# Patient Record
(demographics unavailable — no encounter records)

---

## 2020-11-13 NOTE — EMERGENCY DEPARTMENT REPORT
ED Dizziness HPI





- General


Chief Complaint: Dizziness


Stated Complaint: DIZZINESS,SICK


Time Seen by Provider: 20 14:48


Source: patient


Mode of arrival: Ambulatory


Limitations: No Limitations





- History of Present Illness


Initial Comments: 





47-year-old -American male past medical history of hypertension and end-

stage renal disease requiring dialysis was last seen at dialysis today presents 

emergency department complaining of a dull throbbing bifrontal headache which is

been present for the last week associated with dizziness acute.  Reports no 

chest pain, no palpitations, no fever, chills, sweats, no nausea, no vomiting.  

He reports no scotomas.


MD Complaint: dizziness


Timing: sudden onset


Description: "room spinning"


History of Same: No


History of Trauma: No


Severity: mild, moderate


Improves With: nothing


Worsens With: nothing


Associated Symptoms: denies: ataxia, chest pain, confusion, fever/chills, loss o

f appetite, seizure, syncope, weakness





- Related Data


                                  Previous Rx's











 Medication  Instructions  Recorded  Last Taken  Type


 


carvediloL [Coreg] 6.25 mg PO BID #60 tablet 01/15/14 Unknown Rx











                                    Allergies











Allergy/AdvReac Type Severity Reaction Status Date / Time


 


No Known Allergies Allergy   Unverified 01/10/14 09:48














ED Review of Systems


ROS: 


Stated complaint: DIZZINESS,SICK


Other details as noted in HPI





Comment: All other systems reviewed and negative





ED Past Medical Hx





- Past Medical History


Previous Medical History?: Yes


Hx Hypertension: Yes


Hx Congestive Heart Failure: No


Hx Diabetes: No


Hx Renal Disease: Yes (Dialysis (MWF))


Hx Asthma: No


Hx COPD: No


Additional medical history: Brugada Syndrome, ventral abdominal hernia





- Surgical History


Past Surgical History?: No





- Social History


Smoking Status: Never Smoker


Substance Use Type: None





- Medications


Home Medications: 


                                Home Medications











 Medication  Instructions  Recorded  Confirmed  Last Taken  Type


 


carvediloL [Coreg] 6.25 mg PO BID #60 tablet 01/15/14  Unknown Rx














ED Physical Exam





- General


Limitations: No Limitations


General appearance: alert, in no apparent distress





- Head


Head exam: Present: atraumatic, normocephalic





- Eye


Eye exam: Present: normal appearance, PERRL, EOMI


Pupils: Present: normal accommodation





- ENT


ENT exam: Present: normal exam, mucous membranes moist, normal external ear 

exam.  Absent: normal orophraynx, mucous membranes dry





- Neck


Neck exam: Present: normal inspection, full ROM.  Absent: tenderness





- Respiratory


Respiratory exam: Present: normal lung sounds bilaterally.  Absent: respiratory 

distress, wheezes, rhonchi, stridor





- Cardiovascular


Cardiovascular Exam: Present: regular rate, normal rhythm.  Absent: bradycardia,

 tachycardia, systolic murmur, diastolic murmur, rubs, gallop





- GI/Abdominal


GI/Abdominal exam: Present: soft, normal bowel sounds.  Absent: distended, ten

derness, guarding





- Rectal


Rectal exam: Present: deferred.  Absent: normal inspection, normal rectal tone





- Extremities Exam


Extremities exam: Present: normal inspection





- Back Exam


Back exam: Present: normal inspection, full ROM, muscle spasm





- Neurological Exam


Neurological exam: Present: alert, oriented X3, CN II-XII intact





- Psychiatric


Psychiatric exam: Present: normal affect, normal mood





- Skin


Skin exam: Present: warm, dry, intact, normal color.  Absent: rash





ED Course


                                   Vital Signs











  20





  12:18


 


Temperature 97.8 F


 


Pulse Rate 73


 


Respiratory 18





Rate 


 


Blood Pressure 141/80


 


O2 Sat by Pulse 97





Oximetry 














ED Medical Decision Making





- Lab Data


Result diagrams: 


                                 20 12:48





                                 20 12:48





- Radiology Data





[facility] 


[address] [place] [address] 





Cat Scan Report 


Signed 





 Patient: KIRBY GAUTHIER MR#: [id number] 


 8 


 : 1973 Acct:[id number] 





 Age/Sex: 47 / M ADM Date: 20 





 Loc: ED 


 Attending Dr: 








 Ordering Physician: IVET FONTANA 


 Date of Service: 20 


 Procedure(s): CT head/brain wo con 


 Accession Number(s): [id number] 





 cc: IVET FONTANA 








 CT head/brain wo con 





INDICATION / CLINICAL INFORMATION: 


47 years Male; headache. 





TECHNIQUE: Routine CT head without contrast. All CT scans at this location are 

performed using CT 


 dose reduction for ALARA by means of automated exposure control. 





COMPARISON: 


1/10/2014 





FINDINGS: 





BRAIN / INTRACRANIAL CONTENTS: Multiple, small lacunar infarcts seen in the 

anterior 


 gangliocapsular regions. These findings are new from prior, but have a chronic 

appearance. 





It would be difficult to evaluate for a small focus of acute ischemia without 

diffusion imaging by 


 MRI. 





Otherwise, no acute hemorrhage, mass effect, midline shift, hydrocephalus, or 

acute, large 


 territorial infarct. No signs of significant atrophy or chronic infarct. Mild, 

nonspecific white 


 matter disease noted, which has progressed from prior, as well. 





CRANIOCERVICAL JUNCTION: No significant abnormality. 





ORBITS: No significant abnormality of visualized orbits. 


SINUSES / MASTOIDS: Small air-fluid level seen in the right sphenoid sinus. Mild

 mucosal thickening


 seen in the ethmoids as well. 





ADDITIONAL FINDINGS: None. 





IMPRESSION: 


1. Progression and white matter and gangliocapsular disease as described above. 

Follow-up with 


 diffusion imaging by MRI, as clinically warranted. 


2. Otherwise, no focal mass, hemorrhage, hydrocephalus, or acute, large infarct 

appreciated. 








Signer Name: Kyle Malin MD, III 


Signed: 2020 4:33 PM 


Workstation Name: [email or web address] 








 Transcribed By:  


 Dictated By: Kyle Malin MD 


 Electronically Authenticated By: Kyle Malin MD 


 Signed Date/Time: 20 1633 











 DD/DT: 20 


 TD/TT: 





- Medical Decision Making





This patient presents with a headache. Differential diagnosis includes migraine 

versus tension type headache. No headache red flags. Neurologic exam without 

evidence of meningismus, focal neurologic findings.Based on the patient's 

history and physical there is very low clinical suspicion for significant 

intracranial pathology. The headache was NOT sudden onset, NOT maximal at onset,

 there are NO neurologic findings, the patient does NOT have a fever, the 

patient does NOT have any jaw claudication, the patient does NOT endorse a 

clotting disorder, patient DENIES any trauma or eye pain and the headache is NOT

 associated with dizziness or ataxia.  Presentation not consistent with acute 

intracranial bleed to include SAH (lack of risk factors, headache history). 

Presentation not consistent with acute CNS infection to include meningitis or 

brain abscess, Temporal arteritis unlikely, as is acute angle closure glaucoma 

given history and physical findings. Presentation not consistent with other 

acute, emergent causes of headache at this time. Plan to treat symptomatically 

with pain medication. No indication for imaging/LP at this time.





Plan: pain medication, CT brain shows chronic infarct changes which may need 

further evaluation with an MRI at a later date.  He remained neurologically 

stable and intact throughout his entire emergency department visit here today.  

No signs of any neurological deficit.  I will provide him with a copy of his CT 

scan report and advised him explicitly the need to follow-up with neurology for 

further evaluation and treatment options but advised him to start with his 

primary care provider to guide and follow along with him in this process.  Also 

advised him on the on the need for compliance with his dialysis.  It is very 

likely this is a postdialysis headache him and post dialysis presyncope given 

the onset.








Critical care attestation.: 


If time is entered above; I have spent that time in minutes in the direct care 

of this critically ill patient, excluding procedure time.








ED Disposition


Clinical Impression: 


 Cephalgia





Disposition: DC-01 TO HOME OR SELFCARE


Is pt being admited?: No


Does the pt Need Aspirin: No


Condition: Stable


Instructions:  General Headache Without Cause


Referrals: 


PRIMARY CARE,MD [Primary Care Provider] - 3-5 Days


Forms:  Work/School Release Form(ED)

## 2020-11-13 NOTE — EVENT NOTE
ED Screening Note


Date of service: 11/13/20


Time: 12:16


ED Screening Note: 


47-year-old  male brought in states that he has dizziness x1 week.  

Denies any chest pain shortness of breathing no nausea no vomiting or abdominal 

pain.  Admits to headache no blurred vision.





This initial assessment/diagnostic orders/clinical plan/treatment(s) is/are 

subject to change based on patients health status, clinical progression and re-

assessment by fellow clinical providers in the ED. Further treatment and workup 

at subsequent clinical providers discretion. Patient/guardian urged not to elope

from the ED as their condition may be serious if not clinically assessed and 

managed. 





Initial orders include:

## 2022-05-08 NOTE — EMERGENCY DEPARTMENT REPORT
ED General Adult HPI





- General


Chief complaint: Dyspnea/Respdistress


Stated complaint: SOB,COUGHING UP BLOOD


PUI?: No


Source: patient, EMS ( EMS documentation not available at time of chart 

dictation ), RN notes reviewed, old records reviewed


Limitations: Physical Limitation





- History of Present Illness


Initial comments: 





Nephrology: Dr. KALEN Fallon








The patient is a 48-year-old gentleman, with a history of end-stage renal 

disease on hemodialysis, Monday, Wednesday, Friday, DVT, currently on Eliquis, 

history of Brugada syndrome.





Patient was last dialyzed on Friday.





He presents to the ER today with a complaint of tongue bleeding, where he 

believes he accidentally bit his tongue, cough, wheezing and shortness of 

breath.





He denies physical pain at this time.


-: Gradual


Consistency: constant


Improves with: rest


Worsens with: movement





- Related Data


                                  Previous Rx's











 Medication  Instructions  Recorded  Last Taken  Type


 


carvediloL [Coreg] 6.25 mg PO BID #60 tablet 01/15/14 Unknown Rx











                                    Allergies











Allergy/AdvReac Type Severity Reaction Status Date / Time


 


No Known Allergies Allergy   Unverified 01/10/14 09:48














ED Review of Systems


ROS: 


Stated complaint: SOB,COUGHING UP BLOOD


Other details as noted in HPI





Constitutional: denies: fever


Eyes: denies: eye discharge


ENT: congestion.  denies: epistaxis


Respiratory: cough, shortness of breath, wheezing


Cardiovascular: edema.  denies: chest pain


Gastrointestinal: denies: abdominal pain, vomiting, hematemesis, melena, 

hematochezia


Genitourinary: denies: dysuria


Musculoskeletal: denies: back pain


Neurological: weakness


Hematological/Lymphatic: easy bleeding





ED Past Medical Hx





- Past Medical History


Hx Hypertension: Yes


Hx Congestive Heart Failure: No


Hx Diabetes: No


Hx Renal Disease: Yes (Dialysis (MWF))


Hx Asthma: No


Hx COPD: No


Additional medical history: Brugada Syndrome, ventral abdominal hernia





- Social History


Smoking Status: Never Smoker


Substance Use Type: None





- Medications


Home Medications: 


                                Home Medications











 Medication  Instructions  Recorded  Confirmed  Last Taken  Type


 


carvediloL [Coreg] 6.25 mg PO BID #60 tablet 01/15/14  Unknown Rx














ED Physical Exam





- General


Limitations: Physical Limitation


General appearance: alert, anxious, in distress





- Head


Head exam: Present: atraumatic, normocephalic





- Eye


Eye exam: Present: normal appearance, EOMI.  Absent: nystagmus





- ENT


ENT exam: Present: normal exam, normal orophraynx, mucous membranes moist, 

normal external ear exam, other (There is superficial bleeding noted to the 

dorsal aspect of the anterior)





- Neck


Neck exam: Present: normal inspection, full ROM.  Absent: tenderness, 

meningismus





- Respiratory


Respiratory exam: Present: respiratory distress, wheezes, rales, rhonchi





- Cardiovascular


Cardiovascular Exam: Present: regular rate, normal rhythm, normal heart sounds, 

JVD.  Absent: bradycardia, tachycardia, irregular rhythm, systolic murmur, 

diastolic murmur, rubs, gallop





- GI/Abdominal


GI/Abdominal exam: Present: soft, hernia.  Absent: distended, tenderness, 

guarding, rebound, rigid





- Rectal


Rectal exam: Present: deferred





- Extremities Exam


Extremities exam: Present: normal inspection, full ROM, pedal edema, other (2+ 

pulses noted in the bilateral upper and lower extremities.  There is no palpable

 cord.   negative Homans sign.  Muscular compartments are soft.  The pelvis is 

stable.).  Absent: calf tenderness





- Back Exam


Back exam: Present: normal inspection.  Absent: tenderness, CVA tenderness (R), 

CVA tenderness (L), paraspinal tenderness, vertebral tenderness





- Neurological Exam


Neurological exam: Present: alert, oriented X3, other (No facial droop.  Tongue 

midline.  Extraocular movements intact bilaterally.  Facial sensation intact to 

light touch in V1, V2, V3 distribution bilaterally.  5 and a 5 strength in 4 

extremities.  Sensation intact to light touch in 4 extremities.).  Absent: motor

 sensory deficit





- Psychiatric


Psychiatric exam: Present: anxious





- Skin


Skin exam: Present: warm, dry, intact, normal color.  Absent: rash





ED Course


                                   Vital Signs











  22





  00:05 01:02


 


Temperature 99 F 


 


Pulse Rate 84 77


 


Respiratory 20 16





Rate  


 


Blood Pressure 180/110 


 


Blood Pressure  194/114





[Left]  


 


O2 Sat by Pulse 98 100





Oximetry  














- Reevaluation(s)


Reevaluation #1: 





22 01:36


Differential diagnosis, including but not limited to: Flash pulmonary edema, 

end-stage renal disease on hemodialysis, anticoagulated, volume overload, 

hypertensive emergency





Assessment and plan: 48-year-old gentleman, with JVD, lower extremity edema, 

crackles, rales, hypertensive urgency, likely flash pulmonary edema, and fluid 

overload.  Start patient on nitroglycerin, as well as desmopressin.





Patient spitting up blood from tongue, likely secondary to superficial bite on 

tongue, do not see obvious wound to suture, start desmopressin.





Given this, would not start patient on BiPAP, but rather will start high flow 

high humidity nasal cannula.





EKG reviewed and appreciated.  Laboratory studies reviewed and appreciated.  

Elevated troponin is likely a type II troponin leak.





Contacted covering nephrology Dr Fallon, and critical care, Dr. Puckett








Discussed the patient's history, physical, laboratory studies and imaging studi

es and clinical impression.





His nephrologist will follow in consultation, and arrange for urgent he

modialysis.





Critical care will arrange for admission to the intensive care unit.





High-dose Lasix ordered.





Patient is agreeable to admission hospitalization.





Hospital physician, Dr. Berrios to admit to [facility]











Medical decision makin-year-old gentleman with evidence of fluid overload, 

hypertensive emergency, requires initiation of high flow high humidity nasal 

cannula, high-dose Lasix, nitroglycerin drip, urgent hemodialysis.


22 01:38








ED Medical Decision Making





- Lab Data


Result diagrams: 


                                 22 00:13





                                 22 00:13








Labs











  22





  00:13 00:13 00:13


 


WBC  5.6  


 


RBC  3.28 L  


 


Hgb  8.6 L  


 


Hct  27.5 L  


 


MCV  84  


 


MCH  26 L  


 


MCHC  31 L  


 


RDW  20.7 H  


 


Plt Count  166  


 


Lymph % (Auto)  8.8 L  


 


Mono % (Auto)  7.6 H  


 


Eos % (Auto)  2.3  


 


Baso % (Auto)  1.1  


 


Lymph # (Auto)  0.5 L  


 


Mono # (Auto)  0.4  


 


Eos # (Auto)  0.1  


 


Baso # (Auto)  0.1  


 


Seg Neutrophils %  80.2 H  


 


Seg Neutrophils #  4.5  


 


PT   14.1 


 


INR   0.98 


 


APTT   28.1 


 


Albumin/Globulin Ratio    1.5











                                   Lab Results











  22 Range/Units





  00:13 00:13 00:13 


 


WBC  5.6    (4.5-11.0)  K/mm3


 


RBC  3.28 L    (3.65-5.03)  M/mm3


 


Hgb  8.6 L    (11.8-15.2)  gm/dl


 


Hct  27.5 L    (35.5-45.6)  %


 


MCV  84    (84-94)  fl


 


MCH  26 L    (28-32)  pg


 


MCHC  31 L    (32-34)  %


 


RDW  20.7 H    (13.2-15.2)  %


 


Plt Count  166    (140-440)  K/mm3


 


Lymph % (Auto)  8.8 L    (13.4-35.0)  %


 


Mono % (Auto)  7.6 H    (0.0-7.3)  %


 


Eos % (Auto)  2.3    (0.0-4.3)  %


 


Baso % (Auto)  1.1    (0.0-1.8)  %


 


Lymph # (Auto)  0.5 L    (1.2-5.4)  K/mm3


 


Mono # (Auto)  0.4    (0.0-0.8)  K/mm3


 


Eos # (Auto)  0.1    (0.0-0.4)  K/mm3


 


Baso # (Auto)  0.1    (0.0-0.1)  K/mm3


 


Seg Neutrophils %  80.2 H    (40.0-70.0)  %


 


Seg Neutrophils #  4.5    (1.8-7.7)  K/mm3


 


PT   14.1   (12.2-14.9)  Sec.


 


INR   0.98   (0.87-1.13)  


 


APTT   28.1   (24.2-36.6)  Sec.


 


Albumin/Globulin Ratio    1.5  %











                                   Vital Signs











  22





  00:05


 


Temperature 99 F


 


Pulse Rate 84











                                   Lab Results











  22 Range/Units





  00:13 00:13 00:13 


 


WBC  5.6    (4.5-11.0)  K/mm3


 


RBC  3.28 L    (3.65-5.03)  M/mm3


 


Hgb  8.6 L    (11.8-15.2)  gm/dl


 


Hct  27.5 L    (35.5-45.6)  %


 


MCV  84    (84-94)  fl


 


MCH  26 L    (28-32)  pg


 


MCHC  31 L    (32-34)  %


 


RDW  20.7 H    (13.2-15.2)  %


 


Plt Count  166    (140-440)  K/mm3


 


Lymph % (Auto)  8.8 L    (13.4-35.0)  %


 


Mono % (Auto)  7.6 H    (0.0-7.3)  %


 


Eos % (Auto)  2.3    (0.0-4.3)  %


 


Baso % (Auto)  1.1    (0.0-1.8)  %


 


Lymph # (Auto)  0.5 L    (1.2-5.4)  K/mm3


 


Mono # (Auto)  0.4    (0.0-0.8)  K/mm3


 


Eos # (Auto)  0.1    (0.0-0.4)  K/mm3


 


Baso # (Auto)  0.1    (0.0-0.1)  K/mm3


 


Seg Neutrophils %  80.2 H    (40.0-70.0)  %


 


Seg Neutrophils #  4.5    (1.8-7.7)  K/mm3


 


PT   14.1   (12.2-14.9)  Sec.


 


INR   0.98   (0.87-1.13)  


 


APTT   28.1   (24.2-36.6)  Sec.


 


Sodium    132 L  (137-145)  mmol/L


 


Potassium    3.6  (3.6-5.0)  mmol/L


 


Chloride    91.2 L  ()  mmol/L


 


Carbon Dioxide    24  (22-30)  mmol/L


 


Anion Gap    20  mmol/L


 


BUN    44 H  (9-20)  mg/dL


 


Creatinine    11.0 H  (0.8-1.3)  mg/dL


 


Estimated GFR    6  ml/min


 


BUN/Creatinine Ratio    4  %


 


Glucose    89  ()  mg/dL


 


Calcium    8.6  (8.4-10.2)  mg/dL


 


Magnesium    2.60 H  (1.7-2.3)  mg/dL


 


Total Bilirubin    0.60  (0.1-1.2)  mg/dL


 


AST    13  (5-40)  units/L


 


ALT    12  (7-56)  units/L


 


Alkaline Phosphatase    152 H  ()  units/L


 


Total Creatine Kinase    329 H  ()  units/L


 


Troponin T    0.075 H  (0.00-0.029)  ng/mL


 


Total Protein    6.9  (6.3-8.2)  g/dL


 


Albumin    4.1  (3.9-5)  g/dL


 


Albumin/Globulin Ratio    1.5  %














- EKG Data


-: EKG Interpreted by Me


EKG shows normal: sinus rhythm





- EKG Data





22 00:56


The EKG is interpreted at 23: 58





Sinus rhythm, 77 bpm.  Normal axis, normal P wave axis, PVCs, QTC 4 6 2 ms, WI 

interval, 210 ms.  There is poor R wave progression.  This is not a STEMI





- Radiology Data


Radiology results: pending, report reviewed, image reviewed





CHEST 1 VIEW 2022 11:18 PM  INDICATION / CLINICAL INFORMATION: Dyspnea.  

COMPARISON: 1/10/2014  FINDINGS:  SUPPORT DEVICES: Left IJ catheter terminating 

in the mid SVC. HEART / MEDIASTINUM: There is cardiomegaly LUNGS / PLEURA: 

Pulmonary vascular indistinctness and cephalization. Increased interstitial 

prominence. There is increased opacification within the right lower lobe. No 

pneumothorax.  ADDITIONAL FINDINGS: No significant additional findings.  IMP

RESSION: 1. Cardiomegaly with moderate severe pulmonary edema. 2. Opacification 

within the right lower lobe which may represent atelectasis versus infiltrate.  

Signer Name: Husam Ann DO Signed: 2022 11:21 PM Workstation 

Name: [email or web address]


Critical Care Time: Yes


Critical care time in (mins) excluding proc time.: 35


Critical care attestation.: 


If time is entered above; I have spent that time in minutes in the direct care 

of this critically ill patient, excluding procedure time.








ED Disposition


Clinical Impression: 


 Hypertensive emergency, End stage renal disease, Pulmonary edema, 

Anticoagulated, Hemorrhage of tongue





Disposition:  ADMITTED AS INPATIENT


Is pt being admited?: Yes


Does the pt Need Aspirin: No


Condition: Critical


Instructions:  Pulmonary Edema (ED), Hypertension (ED)


Referrals: 


LIBBY BREWSTER MD [Primary Care Provider] - 3-5 Days

## 2022-05-09 NOTE — EVENT NOTE
<IVETTE ARMSTRONG - Last Filed: 05/09/22 17:52>


Date: 05/09/22


This is a 48-year-old male with known past medical history of ESRD on HD(M,W,F),

DVT on Eliquis, Brugada syndromed, and per patient O2 dependet at home admitted 

for pulmonary edema and hypertensive urgency





Patient seen and examined at the bedside. Patient is fully AAO, on HHFL at 40% 

and 20L SPO2 above 92%. Patient still with dyspnea with exertion, plan for HD 

today per Nephrology. Oral bleeding resolved, H&H remains strable, will resume 

home AC- Eliquis. Left upper chest Permacath and LUE Av-fistula noted, per 

patient plan was to remove permacath two weeks ago however appointment was 

rescheduled due to unforeseen events. D/w Nephro plan for possible permacath 

removal sometimes this week. Patient remains on Nitro gtt, still hypertensive 

this am. Will resume patient's home antihypertensive regimen. Titrate Nitro gtt 

for SBP less than 160. CCM is also following. Diagnosis and plan of care 

discussed with patient at the bedside. Patient verbalized understanding and 

agreed with current plan of care. All questions and concerns addressed at this 

time. Team will continue to f/u with any further updates.





 





<CHANEL MIRANDA - Last Filed: 05/10/22 07:36>


I saw and evaluated the patient. I agree with the findings and the plan of care 

as documented in the Nurse Practitioner's~note, with the following corrections 

and additions.

## 2022-05-09 NOTE — XRAY REPORT
CHEST 1 VIEW 5/8/2022 11:18 PM



INDICATION / CLINICAL INFORMATION: Dyspnea.



COMPARISON: 1/10/2014



FINDINGS:



SUPPORT DEVICES: Left IJ catheter terminating in the mid SVC.

HEART / MEDIASTINUM: There is cardiomegaly 

LUNGS / PLEURA: Pulmonary vascular indistinctness and cephalization. Increased interstitial prominenc
e. There is increased opacification within the right lower lobe. No pneumothorax. 



ADDITIONAL FINDINGS: No significant additional findings.



IMPRESSION:

1. Cardiomegaly with moderate severe pulmonary edema.

2. Opacification within the right lower lobe which may represent atelectasis versus infiltrate.



Signer Name: Husam Ann DO 

Signed: 5/9/2022 12:21 AM

Workstation Name: [email or web address]

## 2022-05-09 NOTE — ELECTROCARDIOGRAPH REPORT
[facility]

                                       

Test Date:    2022               Test Time:    23:58:10

Pat Name:     KIRBY GAUTHIER            Department:   

Patient ID:   [id number]          Room:         A263 1

Gender:       M                        Technician:   CHERRY

:          1973               Requested By: NAKITA BUTLER

Order Number: [id number]              Reading MD:   Austin Chen

                                 Measurements

Intervals                              Axis          

Rate:         77                       P:            58

NY:           210                      QRS:          45

QRSD:         87                       T:            47

QT:           406                                    

QTc:          462                                    

                           Interpretive Statements

Sinus rhythm

Ventricular premature complex

nonspecific st-t

No previous ECG available for comparison

Electronically Signed On 2022 11:23:50 EDT by Austin Chen

## 2022-05-09 NOTE — CONSULTATION
History of Present Illness





- Reason for Consult


Consult date: 05/09/22


end stage renal disease





- History of Present Illness


The patient is a 59 YO male with history significant for Hypertension, DVT on 

Eliquis, Anemia, history of Brugada syndrome and ESRD on hemodialysis(MWF) who 

presented to [facility] ED 5/8/22 with c/o sob for 2 days.  Associated symptoms 

include cough, orthopnea and wheezing.  Patient also reports tongue bleeding, 

where he believes he accidentally bit his tongue.  Admits to drinking excessive 

fluids.  Patient denies any N, V, abd pain, fever, chills, rash, leg swelling, 

cp, dizziness, hemoptysis or weakness.  He was last dialyzed 3 days ago.  All 

lab and imaging studies reviewed.  Patient is currently on HFNC O2.  Nephrology 

consulted for treatment of ESRD and volume overload.








Past History


Past Medical History: anemia, dialysis, ESRD, hypertension, renal failure, other

(Brugada Syndrome, ventral abdominal hernia)


Past Surgical History: No surgical history


Social history: no significant social history


Family history: hypertension





Medications and Allergies


                                    Allergies











Allergy/AdvReac Type Severity Reaction Status Date / Time


 


No Known Allergies Allergy   Unverified 01/10/14 09:48











                                Home Medications











 Medication  Instructions  Recorded  Confirmed  Last Taken  Type


 


carvediloL [Coreg] 6.25 mg PO BID #60 tablet 01/15/14 05/10/22 Unknown Rx


 


Clopidogrel [Plavix] 75 mg PO QDAY 05/09/22 05/09/22 Unknown History


 


Doxazosin [Cardura] 4 mg PO BID 05/09/22 05/09/22 Unknown History


 


Eliquis 5 mg PO BID 05/09/22 05/09/22 Unknown History


 


Isosorbide Mononitrate [Isosorbide 60 mg PO QDAY 05/09/22 05/09/22 Unknown 

History





Mononitrate ER]     


 


Nifedipine ER 90 mg PO QDAY 05/09/22 05/09/22 Unknown History


 


hydrALAZINE 100 mg PO BID 05/09/22 05/09/22 Unknown History











Active Meds: 


Active Medications





Acetaminophen (Acetaminophen 325 Mg Tab)  650 mg PO Q4H PRN


   PRN Reason: Pain MILD(1-3)/Fever >100.5/HA


Albuterol (Albuterol 2.5 Mg/3 Ml Nebu)  2.5 mg IH Q3HRT PRN


   PRN Reason: Shortness Of Breath


Albuterol/Ipratropium (Ipratropium/Albuterol Sulfate 3 Ml Ampul.Neb)  1 ampul IH

Q6HRT [facility]


   Last Admin: 05/09/22 08:05 Dose:  1 ampul


   


Carvedilol (Carvedilol 6.25 Mg Tab)  6.25 mg PO BID [facility]


Epoetin Ranjeet-epbx (Epoetin Ranjeet-Epbx 20,000 Unit/1 Ml Vial)  20,000 unit SUB-Q 

DEONDRE PRN


   PRN Reason: hemodialysis


Famotidine (Famotidine 20 Mg/2 Ml Inj)  20 mg IV DAILY [facility]


Heparin Sodium (Porcine) (Heparin 10,000 Units/10 Ml Vial)  3,000 unit IV DEONDRE 

PRN


   PRN Reason: hemodialysis


Hydromorphone HCl (Hydromorphone 1 Mg/1 Ml Inj)  0.5 mg IV Q3H PRN


   PRN Reason: Pain , Severe (7-10)


Nitroglycerin/Dextrose (Tridil Drip 50mg/250ml)  50 mg in 250 mls @ 3 mls/hr IV 

TITR [facility]; Protocol


   Last Admin: 05/09/22 02:10 Dose:  10 mcg/min, 3 mls/hr


   


Sodium Chloride (Nacl 0.9%)  100 mls @ 999 mls/hr IV DEONDRE PRN


   PRN Reason: Hypotension


Morphine Sulfate (Morphine 2 Mg/1 Ml Inj)  2 mg IV Q4H PRN


   PRN Reason: Pain, Moderate (4-6)


Ondansetron HCl (Ondansetron 4 Mg/2 Ml Inj)  4 mg IV Q8H PRN


   PRN Reason: Nausea And Vomiting


Sodium Chloride (Sodium Chloride 0.9% 10 Ml Flush Syringe)  10 ml IV BID [facility]


Sodium Chloride (Sodium Chloride 0.9% 10 Ml Flush Syringe)  10 ml IV PRN PRN


   PRN Reason: LINE FLUSH











Review of Systems


All systems: negative





Exam





- Vital Signs


Vital signs: 


                                   Vital Signs











Temp Pulse Resp BP Pulse Ox


 


 99 F   84   20   180/110   98 


 


 05/09/22 00:05  05/09/22 00:05  05/09/22 00:05  05/09/22 00:05  05/09/22 00:05














Results





- Lab Results





                                 05/11/22 04:53





                                 05/11/22 04:53


                             Most recent lab results











ABG pH  7.448  (7.320-7.450)   05/09/22  01:30    


 


ABG O2 Saturation  88.6  (0-100)   05/09/22  01:30    


 


Calcium  8.6 mg/dL (8.4-10.2)   05/09/22  00:13    


 


Magnesium  2.60 mg/dL (1.7-2.3)  H  05/09/22  00:13    














Assessment and Plan


1. ESRD:


Patient is on maintenance hemodialysis, MWF schedule.


Last outpatient HD 5/6.


Hemodialysis: today.


 


2. FEN:


Volume overload, UF with HD, monitor.


Counseled to limit fluid intake.


Monitor lytes and volume status.





3. Acute hypoxic respiratory failure, POA:


2/2 Acute pulmonary edema in the setting of volume overload.


R/o CHF.


Echo.


Volume control thru HD.


Supplemental O2, wean as tolerated.





4. Suspected CHF:


Clinical findings.


Echo pending.





5. Hx of DVT:


Eliquis.





6. Hypertension:


Continue home meds.


Volume control thru HD.


Follow BP.





7. Anemia, POA:


Chronic.


Epogen with HD as needed.


Monitor.





8. Hemorrhage from tongue, POA:


Stable.  We will monitor the patient closely





9. Brugada syndrome.











Subjective:


Patient was seen and examined at the bedside.











Examination:


General appearance: well-developed, appears stated age, thin built, resp 

distress noted, HFNC O2


HEENT: ADDY


Neck: trachea midline


Respiratory: bibasal rales heard


Heart: S1S2, no murmur


Abdomen: soft, bowel sounds heard, NT, no palpable mass


Integumentary: no obvious rash


Neurologic: AO, non-focal


Ext: no edema


Hemodialysis access: L arm AVF, R IJ tunnel catheter

## 2022-05-09 NOTE — PROGRESS NOTE
Hospitalist Physical





- Constitutional


Vitals: 


                                        











Temp Pulse Resp BP Pulse Ox


 


 99 F   86   24   191/111   98 


 


 05/09/22 00:05  05/09/22 10:00  05/09/22 10:00  05/09/22 09:59  05/09/22 10:00











General appearance: Present: no acute distress, well-nourished





HEART Score





- HEART Score


Troponin: 


                                        











Troponin T  0.075 ng/mL (0.00-0.029)  H  05/09/22  00:13    














Results





- Labs


CBC & Chem 7: 


                                 05/09/22 00:13





                                 05/09/22 00:13


Labs: 


                             Laboratory Last Values











WBC  5.6 K/mm3 (4.5-11.0)   05/09/22  00:13    


 


RBC  3.28 M/mm3 (3.65-5.03)  L  05/09/22  00:13    


 


Hgb  8.6 gm/dl (11.8-15.2)  L  05/09/22  00:13    


 


Hct  27.5 % (35.5-45.6)  L  05/09/22  00:13    


 


MCV  84 fl (84-94)   05/09/22  00:13    


 


MCH  26 pg (28-32)  L  05/09/22  00:13    


 


MCHC  31 % (32-34)  L  05/09/22  00:13    


 


RDW  20.7 % (13.2-15.2)  H  05/09/22  00:13    


 


Plt Count  166 K/mm3 (140-440)   05/09/22  00:13    


 


Lymph % (Auto)  8.8 % (13.4-35.0)  L  05/09/22  00:13    


 


Mono % (Auto)  7.6 % (0.0-7.3)  H  05/09/22  00:13    


 


Eos % (Auto)  2.3 % (0.0-4.3)   05/09/22  00:13    


 


Baso % (Auto)  1.1 % (0.0-1.8)   05/09/22  00:13    


 


Lymph # (Auto)  0.5 K/mm3 (1.2-5.4)  L  05/09/22  00:13    


 


Mono # (Auto)  0.4 K/mm3 (0.0-0.8)   05/09/22  00:13    


 


Eos # (Auto)  0.1 K/mm3 (0.0-0.4)   05/09/22  00:13    


 


Baso # (Auto)  0.1 K/mm3 (0.0-0.1)   05/09/22  00:13    


 


Seg Neutrophils %  80.2 % (40.0-70.0)  H  05/09/22  00:13    


 


Seg Neutrophils #  4.5 K/mm3 (1.8-7.7)   05/09/22  00:13    


 


PT  14.1 Sec. (12.2-14.9)   05/09/22  00:13    


 


INR  0.98  (0.87-1.13)   05/09/22  00:13    


 


APTT  28.1 Sec. (24.2-36.6)   05/09/22  00:13    


 


ABG pH  7.448  (7.320-7.450)   05/09/22  01:30    


 


POC ABG pCO2  31.9 mmHg (32.0-48.0)  L  05/09/22  01:30    


 


POC ABG pO2  53.3 mmHg ()  L  05/09/22  01:30    


 


POC ABG HCO3  21.6   05/09/22  01:30    


 


ABG O2 Saturation  88.6  (0-100)   05/09/22  01:30    


 


POC ABG Base Excess  -1.8   05/09/22  01:30    


 


ABG Hemoglobin  10.9  (12.0-17.5)  L  05/09/22  01:30    


 


ABG Oxyhemoglobin  87.9  (94-98)  L  05/09/22  01:30    


 


ABG Methemoglobin  0.3  (0.0-1.5)   05/09/22  01:30    


 


ABG Sodium  Not Reportable   05/09/22  01:30    


 


ABG Potassium  Not Reportable   05/09/22  01:30    


 


ABG Chloride  Not Reportable   05/09/22  01:30    


 


ABG Glucose  Not Reportable   05/09/22  01:30    


 


Carboxyhemoglobin  0.5  (0.5-1.5)   05/09/22  01:30    


 


FiO2 %  21.0   05/09/22  01:30    


 


Sodium  132 mmol/L (137-145)  L  05/09/22  00:13    


 


Potassium  3.6 mmol/L (3.6-5.0)   05/09/22  00:13    


 


Chloride  91.2 mmol/L ()  L  05/09/22  00:13    


 


Carbon Dioxide  24 mmol/L (22-30)   05/09/22  00:13    


 


Anion Gap  20 mmol/L  05/09/22  00:13    


 


BUN  44 mg/dL (9-20)  H  05/09/22  00:13    


 


Creatinine  11.0 mg/dL (0.8-1.3)  H  05/09/22  00:13    


 


Estimated GFR  6 ml/min  05/09/22  00:13    


 


BUN/Creatinine Ratio  4 %  05/09/22  00:13    


 


Glucose  89 mg/dL ()   05/09/22  00:13    


 


Calcium  8.6 mg/dL (8.4-10.2)   05/09/22  00:13    


 


Magnesium  2.60 mg/dL (1.7-2.3)  H  05/09/22  00:13    


 


Total Bilirubin  0.60 mg/dL (0.1-1.2)   05/09/22  00:13    


 


AST  13 units/L (5-40)   05/09/22  00:13    


 


ALT  12 units/L (7-56)   05/09/22  00:13    


 


Alkaline Phosphatase  152 units/L ()  H  05/09/22  00:13    


 


Total Creatine Kinase  329 units/L ()  H  05/09/22  00:13    


 


Troponin T  0.075 ng/mL (0.00-0.029)  H  05/09/22  00:13    


 


NT-Pro-B Natriuret Pep  11092 pg/mL (0-450)  H  05/09/22  00:13    


 


Total Protein  6.9 g/dL (6.3-8.2)   05/09/22  00:13    


 


Albumin  4.1 g/dL (3.9-5)   05/09/22  00:13    


 


Albumin/Globulin Ratio  1.5 %  05/09/22  00:13    


 


Triglycerides  46 mg/dL (2-149)   05/09/22  00:13    


 


Cholesterol  140 mg/dL ()   05/09/22  00:13    


 


LDL Cholesterol Direct  40 mg/dL ()  L  05/09/22  00:13    


 


HDL Cholesterol  88 mg/dL (40-59)  H  05/09/22  00:13    


 


Cholesterol/HDL Ratio  1.59 %  05/09/22  00:13    


 


Arterial Blood Glucose  Not Reportable   05/09/22  01:30    














Active Medications





- Current Medications


Current Medications: 














Generic Name Dose Route Start Last Admin





  Trade Name Freq  PRN Reason Stop Dose Admin


 


Acetaminophen  650 mg  05/09/22 01:38 





  Acetaminophen 325 Mg Tab  PO  





  Q4H PRN  





  Pain MILD(1-3)/Fever >100.5/HA  


 


Albuterol  2.5 mg  05/09/22 01:38 





  Albuterol 2.5 Mg/3 Ml Nebu  IH  





  Q3HRT PRN  





  Shortness Of Breath  


 


Albuterol/Ipratropium  1 ampul  05/09/22 02:00  05/09/22 08:05





  Ipratropium/Albuterol Sulfate 3 Ml Ampul.Neb  IH   1 ampul





  Q6HRT [facility]ANC[facility]   Administration


 


Carvedilol  6.25 mg  05/09/22 10:00  05/09/22 09:59





  Carvedilol 6.25 Mg Tab  PO   6.25 mg





  BID NANCY   Administration


 


Epoetin Ranjeet-epbx  20,000 unit  05/09/22 08:10 





  Epoetin Ranjeet-Epbx 20,000 Unit/1 Ml Vial  SUB-Q  





  DEONDRE PRN  





  hemodialysis  


 


Famotidine  20 mg  05/09/22 10:00  05/09/22 10:00





  Famotidine 20 Mg/2 Ml Inj  IV   20 mg





  DAILY [facility]   Administration


 


Heparin Sodium (Porcine)  3,000 unit  05/09/22 08:10 





  Heparin 10,000 Units/10 Ml Vial  IV  





  DEONDRE PRN  





  hemodialysis  


 


Hydromorphone HCl  0.5 mg  05/09/22 01:38 





  Hydromorphone 1 Mg/1 Ml Inj  IV  





  Q3H PRN  





  Pain , Severe (7-10)  


 


Nitroglycerin/Dextrose  50 mg in 250 mls @ 3 mls/hr  05/09/22 02:00  05/09/22 

02:10





  Tridil Drip 50mg/250ml  IV   10 mcg/min





  TITR NANCY   3 mls/hr





    Administration





  Protocol  





  10 MCG/MIN  


 


Sodium Chloride  100 mls @ 999 mls/hr  05/09/22 08:07 





  Nacl 0.9%  IV  





  DEONDRE PRN  





  Hypotension  


 


Morphine Sulfate  2 mg  05/09/22 01:38 





  Morphine 2 Mg/1 Ml Inj  IV  





  Q4H PRN  





  Pain, Moderate (4-6)  


 


Ondansetron HCl  4 mg  05/09/22 01:38 





  Ondansetron 4 Mg/2 Ml Inj  IV  





  Q8H PRN  





  Nausea And Vomiting  


 


Sodium Chloride  10 ml  05/09/22 10:00  05/09/22 10:00





  Sodium Chloride 0.9% 10 Ml Flush Syringe  IV   10 ml





  BID NANCY   Administration


 


Sodium Chloride  10 ml  05/09/22 01:38 





  Sodium Chloride 0.9% 10 Ml Flush Syringe  IV  





  PRN PRN  





  LINE FLUSH

## 2022-05-09 NOTE — HISTORY AND PHYSICAL REPORT
History of Present Illness


Date of examination: 05/09/22


Date of admission: 


05/09/22


Chief complaint: 





Dyspnea


Respiratory distress


History of present illness: 





 48-year-old male history of end-stage renal disease on hemodialysis, Monday, 

Wednesday, Friday, DVT, currently on Eliquis, history of Brugada 

syndrome.Patient was last dialyzed on Friday.


Patient was brought presents to the emergency room because of tongue bleeding, 

where he believes he accidentally bit his tongue, cough, wheezing and shortness 

of breath.


In the emergency room patient is found to have pulmonary edema, hypertensive 

urgency.  Patient is started on high flow nasal cannula.  We consulted n

ephrology for emergent dialysis.  We also put the patient on nitro drip and 

Lasix high-dose.  We consulted critical care for evaluation





Past History


Past Medical History: ESRD, renal failure, other (Brugada Syndrome, ventral 

abdominal hernia)


Past Surgical History: No surgical history


Social history: no significant social history


Family history: hypertension





Medications and Allergies


                                    Allergies











Allergy/AdvReac Type Severity Reaction Status Date / Time


 


No Known Allergies Allergy   Unverified 01/10/14 09:48











                                Home Medications











 Medication  Instructions  Recorded  Confirmed  Last Taken  Type


 


carvediloL [Coreg] 6.25 mg PO BID #60 tablet 01/15/14  Unknown Rx











Active Meds: 


Active Medications





Acetaminophen (Acetaminophen 325 Mg Tab)  650 mg PO Q4H PRN


   PRN Reason: Pain MILD(1-3)/Fever >100.5/HA


Albuterol (Albuterol 2.5 Mg/3 Ml Nebu)  2.5 mg IH Q3HRT PRN


   PRN Reason: Shortness Of Breath


Albuterol/Ipratropium (Ipratropium/Albuterol Sulfate 3 Ml Ampul.Neb)  1 ampul IH

Q6HRT [facility]


Carvedilol (Carvedilol 6.25 Mg Tab)  6.25 mg PO BID [facility]


Famotidine (Famotidine 20 Mg/2 Ml Inj)  20 mg IV DAILY [facility]


Hydromorphone HCl (Hydromorphone 1 Mg/1 Ml Inj)  0.5 mg IV Q3H PRN


   PRN Reason: Pain , Severe (7-10)


Nitroglycerin/Dextrose (Tridil Drip 50mg/250ml)  50 mg in 250 mls @ 3 mls/hr IV 

TITR [facility]; Protocol


   Last Admin: 05/09/22 02:10 Dose:  10 mcg/min, 3 mls/hr


   


Morphine Sulfate (Morphine 2 Mg/1 Ml Inj)  2 mg IV Q4H PRN


   PRN Reason: Pain, Moderate (4-6)


Ondansetron HCl (Ondansetron 4 Mg/2 Ml Inj)  4 mg IV Q8H PRN


   PRN Reason: Nausea And Vomiting


Sodium Chloride (Sodium Chloride 0.9% 10 Ml Flush Syringe)  10 ml IV BID [facility]


Sodium Chloride (Sodium Chloride 0.9% 10 Ml Flush Syringe)  10 ml IV PRN PRN


   PRN Reason: LINE FLUSH











Review of Systems


All systems: negative


Constitutional: weakness


Respiratory: cough, hemoptysis, shortness of breath, dyspnea on exertion, 

wheezing





Exam





- Constitutional


Vitals: 


                                        











Temp Pulse Resp BP Pulse Ox


 


 99 F   77   16   194/114   100 


 


 05/09/22 00:05  05/09/22 01:02  05/09/22 01:02  05/09/22 01:02  05/09/22 01:41











General appearance: Present: no acute distress, well-nourished





- EENT


Eyes: Present: PERRL


ENT: hearing intact, clear oral mucosa





- Neck


Neck: Present: supple, normal ROM





- Respiratory


Respiratory effort: normal


Respiratory: bilateral: diminished





- Cardiovascular


Heart Sounds: Present: S1 & S2.  Absent: rub, click





- Extremities


Extremities: pulses symmetrical, No edema


Peripheral Pulses: within normal limits





- Abdominal


General gastrointestinal: Present: soft, non-tender, non-distended, normal bowel

sounds


Male genitourinary: Present: normal





- Integumentary


Integumentary: Present: clear, warm, dry





- Musculoskeletal


Musculoskeletal: gait normal, strength equal bilaterally





- Psychiatric


Psychiatric: appropriate mood/affect, intact judgment & insight





- Neurologic


Neurologic: CNII-XII intact, moves all extremities





HEART Score





- HEART Score


Troponin: 


                                        











Troponin T  0.075 ng/mL (0.00-0.029)  H  05/09/22  00:13    














Results





- Labs


CBC & Chem 7: 


                                 05/09/22 00:13





                                 05/09/22 00:13


Labs: 


                             Laboratory Last Values











WBC  5.6 K/mm3 (4.5-11.0)   05/09/22  00:13    


 


RBC  3.28 M/mm3 (3.65-5.03)  L  05/09/22  00:13    


 


Hgb  8.6 gm/dl (11.8-15.2)  L  05/09/22  00:13    


 


Hct  27.5 % (35.5-45.6)  L  05/09/22  00:13    


 


MCV  84 fl (84-94)   05/09/22  00:13    


 


MCH  26 pg (28-32)  L  05/09/22  00:13    


 


MCHC  31 % (32-34)  L  05/09/22  00:13    


 


RDW  20.7 % (13.2-15.2)  H  05/09/22  00:13    


 


Plt Count  166 K/mm3 (140-440)   05/09/22  00:13    


 


Lymph % (Auto)  8.8 % (13.4-35.0)  L  05/09/22  00:13    


 


Mono % (Auto)  7.6 % (0.0-7.3)  H  05/09/22  00:13    


 


Eos % (Auto)  2.3 % (0.0-4.3)   05/09/22  00:13    


 


Baso % (Auto)  1.1 % (0.0-1.8)   05/09/22  00:13    


 


Lymph # (Auto)  0.5 K/mm3 (1.2-5.4)  L  05/09/22  00:13    


 


Mono # (Auto)  0.4 K/mm3 (0.0-0.8)   05/09/22  00:13    


 


Eos # (Auto)  0.1 K/mm3 (0.0-0.4)   05/09/22  00:13    


 


Baso # (Auto)  0.1 K/mm3 (0.0-0.1)   05/09/22  00:13    


 


Seg Neutrophils %  80.2 % (40.0-70.0)  H  05/09/22  00:13    


 


Seg Neutrophils #  4.5 K/mm3 (1.8-7.7)   05/09/22  00:13    


 


PT  14.1 Sec. (12.2-14.9)   05/09/22  00:13    


 


INR  0.98  (0.87-1.13)   05/09/22  00:13    


 


APTT  28.1 Sec. (24.2-36.6)   05/09/22  00:13    


 


ABG pH  7.448  (7.320-7.450)   05/09/22  01:30    


 


POC ABG pCO2  31.9 mmHg (32.0-48.0)  L  05/09/22  01:30    


 


POC ABG pO2  53.3 mmHg ()  L  05/09/22  01:30    


 


POC ABG HCO3  21.6   05/09/22  01:30    


 


ABG O2 Saturation  88.6  (0-100)   05/09/22  01:30    


 


POC ABG Base Excess  -1.8   05/09/22  01:30    


 


ABG Hemoglobin  10.9  (12.0-17.5)  L  05/09/22  01:30    


 


ABG Oxyhemoglobin  87.9  (94-98)  L  05/09/22  01:30    


 


ABG Methemoglobin  0.3  (0.0-1.5)   05/09/22  01:30    


 


ABG Sodium  Not Reportable   05/09/22  01:30    


 


ABG Potassium  Not Reportable   05/09/22  01:30    


 


ABG Chloride  Not Reportable   05/09/22  01:30    


 


ABG Glucose  Not Reportable   05/09/22  01:30    


 


Carboxyhemoglobin  0.5  (0.5-1.5)   05/09/22  01:30    


 


FiO2 %  21.0   05/09/22  01:30    


 


Sodium  132 mmol/L (137-145)  L  05/09/22  00:13    


 


Potassium  3.6 mmol/L (3.6-5.0)   05/09/22  00:13    


 


Chloride  91.2 mmol/L ()  L  05/09/22  00:13    


 


Carbon Dioxide  24 mmol/L (22-30)   05/09/22  00:13    


 


Anion Gap  20 mmol/L  05/09/22  00:13    


 


BUN  44 mg/dL (9-20)  H  05/09/22  00:13    


 


Creatinine  11.0 mg/dL (0.8-1.3)  H  05/09/22  00:13    


 


Estimated GFR  6 ml/min  05/09/22  00:13    


 


BUN/Creatinine Ratio  4 %  05/09/22  00:13    


 


Glucose  89 mg/dL ()   05/09/22  00:13    


 


Calcium  8.6 mg/dL (8.4-10.2)   05/09/22  00:13    


 


Magnesium  2.60 mg/dL (1.7-2.3)  H  05/09/22  00:13    


 


Total Bilirubin  0.60 mg/dL (0.1-1.2)   05/09/22  00:13    


 


AST  13 units/L (5-40)   05/09/22  00:13    


 


ALT  12 units/L (7-56)   05/09/22  00:13    


 


Alkaline Phosphatase  152 units/L ()  H  05/09/22  00:13    


 


Total Creatine Kinase  329 units/L ()  H  05/09/22  00:13    


 


Troponin T  0.075 ng/mL (0.00-0.029)  H  05/09/22  00:13    


 


NT-Pro-B Natriuret Pep  33099 pg/mL (0-450)  H  05/09/22  00:13    


 


Total Protein  6.9 g/dL (6.3-8.2)   05/09/22  00:13    


 


Albumin  4.1 g/dL (3.9-5)   05/09/22  00:13    


 


Albumin/Globulin Ratio  1.5 %  05/09/22  00:13    


 


Triglycerides  46 mg/dL (2-149)   05/09/22  00:13    


 


Cholesterol  140 mg/dL ()   05/09/22  00:13    


 


LDL Cholesterol Direct  40 mg/dL ()  L  05/09/22  00:13    


 


HDL Cholesterol  88 mg/dL (40-59)  H  05/09/22  00:13    


 


Cholesterol/HDL Ratio  1.59 %  05/09/22  00:13    


 


Arterial Blood Glucose  Not Reportable   05/09/22  01:30    














- Imaging and Cardiology


Chest x-ray: report reviewed





Assessment and Plan


VTE prophylaxis?: Mechanical


Plan of care discussed with patient/family: Yes





- Patient Problems


(1) Acute hypoxemic respiratory failure


Current Visit: Yes   Status: Acute   


Plan to address problem: 


Admit the patient to the ICU overnight.  Oxygen per high flow nasal cannula.  

DuoNeb nebulizer every 4 hours.  Albuterol via nebulizer every 4 hours as 

needed.  Reconsult pulmonary and critical care for evaluation








(2) End stage renal disease


Current Visit: Yes   Status: Acute   


Plan to address problem: 


We consulted nephro nephrology for urgent hemodialysis.  Recheck BMP in the 

morning.  Avoid nephrotoxic drug








(3) Hemorrhage of tongue


Current Visit: Yes   Status: Acute   


Plan to address problem: 


Stable.  We will monitor the patient closely








(4) Hypertensive emergency


Current Visit: Yes   Status: Acute   


Plan to address problem: 


Patient is on nitro drip.  Hydralazine 10 mg IV every 6 hours as needed.  We 

continue the home medication








(5) Pulmonary edema


Current Visit: Yes   Status: Acute   


Plan to address problem: 


Oxygen via high flow nasal cannula.  Lasix 80 mg IV x1 dose.  We consulted 

nephrology for urgent dialysis.  Recheck BMP in the morning








(6) Hypertension


Current Visit: No   Status: Acute   


Plan to address problem: 


Hydralazine 10 mg IV every 6 hours as needed.  Patient is on nitro drip for 

elevated blood pressure.  We monitor the blood pressure closely








(7) DVT prophylaxis


Current Visit: Yes   Status: Acute   


Plan to address problem: 


SCD for DVT prophylaxis.  Pepcid 20 mg IV every 12 hours for GI prophylaxis.  

Patient is a full code

## 2022-05-10 NOTE — PROGRESS NOTE
<IVETTE ARMSTRONG - Last Filed: 05/10/22 16:21>





Assessment and Plan


Assessment and plan: 


This is a 48-year-old male with known past medical history of ESRD on HD(M,W,F),

DVT on Eliquis, Brugada syndrome, HTN, MI s/p stent placement, and COPD, O2 

dependent at home admitted for pulmonary edema and hypertensive urgency.





Hospital Course to Date:


5/9: Patient seen and examined at the bedside. Patient is fully AAO, on HHFL at 

40% and 20L SPO2 above 92%. Patient still with dyspnea with exertion, plan for 

HD today per Nephrology. Oral bleeding resolved, H&H remains strable, will 

resume home AC- Eliquis. Left upper chest Permacath and LUE Av-fistula noted, 

per patient plan was to remove permacath two weeks ago however appointment was 

rescheduled due to unforeseen events. D/w Nephro plan for possible permacath 

removal sometimes this week. Patient remains on Nitro gtt, still hypertensive 

this am. Will resume patient's home antihypertensive regimen. Titrate Nitro gtt 

for SBP less than 160. [facility] is also following. Diagnosis and plan of care 

discussed with patient at the bedside. Patient verbalized understanding and 

agreed with current plan of care. All questions and concerns addressed at this 

time. Team will continue to f/u with any further updates.





5/10: Tolerated HD yesterday, 4.5L removed. Now stable on 3L NC SPO2 at 100%. 

Patient is off Nitro gtt this am, SBP in he 170s, home antihypertensive resumed.

Patient reported that he had a heart attack in the past and a stent was placed a

t that time but he does not see a cardiologist. 2D echo pending. D/W CCM patient

is stable for transfer to the floor.








Assessment and Plan


#Acute Hypoxemic Respiratory Failure 2/2


#Pulmonary Edema


#COPD, O2 Dependent


- Presented with SOB, dyspnea required HHFL


- CXR suggesting pulmonary edema


- HD yesterday, 4.5L removed


- Patient down to 3L NC this am SPO2 at 100%


- CCM consulted, appreciate recommendations


- Continue bronchodilators per CCM


- Continue O2 supplementation, wean to home O2 at 2L as tolerated


- Continue HD per Nephro


- Continue SPO2 monitoring for SPO2 goal above 92%





#Hypertensive Urgency


#Cardiomegaly


#Possible CHF


#H/o Brugada syndrome and MI with Stent placement


- Presented with SBP in the 190s requiring Nitroglcerin gtt


- Nitro gtt off this am, SBP still in the 170


- Home antihypertensives and antiplatelets resumed


- 2D echo pending


- Continue blood pressure monitor per protocol


- PRN hydralazine for SBP greater than 160





#End Stage Renal Disease(ESRD) on HD


- regular days M,W,F


- Per patient last HD day was on Friday prior to admit


- Nephrology on consult, appreciated recommendation


- HD yesterday, 4.5L removed


- Continue HD per Nephro


- Strict intake and output


- Avoid nephrotoxic medications; Renally dose medications 


- Monitor and replace electrolytes as needed





#Anemia of Chronic Disease


#Hemorrhage of Tongue-resolved


- Presented with heavy oral bleeding after bitting his tongue


- s/p DDAVP in the ED


- Bleeding resolved, H&H remains stable


- Continue to monitor for s/s of any bleeding


- Trend H&H


- Transfuse for hemoglobin less than 7


- Continue Epogen per Nephro





#H/o DVT


- resumed home Eliquis





#GI/DVT Prophylaxis


- PPI- Pepcid


- resumed home Eliquis








The high probability of a clinically significant, sudden or life threatening 

deterioration of the [multiple] system(s) required my full and direct attention,

 intervention and personal management. The aggregate critical care time was [60]

 minutes. This time is in addition to time spent performing reported procedures 

but includes the following: 





[x] Data Review and interpretation 





[x] Patient assessment and monitoring of vital signs 





[x] Documentation 





[x] Medication orders and management





Disposition Plan: ICU


Total Time Spent with Patient (Minutes): 60 





History


Interval history: 


Patient seen and examined at the bedside. Fully AAO, on 3L NC, denied any pain 

nor discomfort at this time, No signs of respiratory distress noted. Patient is 

off Nitro gtt this am, SBP still in the 170s. Tolerated HD yesterday, 4.5L 

removed.





Hospitalist Physical





- Constitutional


Vitals: 


                                        











Temp Pulse Resp BP Pulse Ox


 


 98.4 F   81   25 H  167/94   100 


 


 05/10/22 08:00  05/10/22 08:45  05/10/22 08:45  05/10/22 08:30  05/10/22 08:43











General appearance: Present: no acute distress, well-nourished





- EENT


Eyes: Present: PERRL


ENT: hearing intact





- Neck


Neck: Present: normal ROM





- Respiratory


Respiratory effort: normal


Respiratory: bilateral: diminished





- Cardiovascular


Rhythm: regular


Heart Sounds: Present: S1 & S2





- Extremities


Extremities: no ischemia, pulses intact, pulses symmetrical


Peripheral Pulses: within normal limits





- Abdominal


General gastrointestinal: soft, non-distended, normal bowel sounds





- Integumentary


Integumentary: Present: clear, warm, dry





- Psychiatric


Psychiatric: appropriate mood/affect, cooperative





- Neurologic


Neurologic: CNII-XII intact, moves all extremities





- Allied Health


Allied health notes reviewed: nursing





HEART Score





- HEART Score


Troponin: 


                                        











Troponin T  0.075 ng/mL (0.00-0.029)  H  05/09/22  00:13    














Results





- Labs


CBC & Chem 7: 


                                 05/10/22 04:47





                                 05/10/22 04:47


Labs: 


                             Laboratory Last Values











WBC  4.4 K/mm3 (4.5-11.0)  L  05/10/22  04:47    


 


RBC  2.69 M/mm3 (3.65-5.03)  L  05/10/22  04:47    


 


Hgb  7.2 gm/dl (11.8-15.2)  L  05/10/22  04:47    


 


Hct  22.2 % (35.5-45.6)  L  05/10/22  04:47    


 


MCV  83 fl (84-94)  L  05/10/22  04:47    


 


MCH  27 pg (28-32)  L  05/10/22  04:47    


 


MCHC  33 % (32-34)   05/10/22  04:47    


 


RDW  20.4 % (13.2-15.2)  H  05/10/22  04:47    


 


Plt Count  124 K/mm3 (140-440)  L  05/10/22  04:47    


 


Lymph % (Auto)  15.2 % (13.4-35.0)   05/10/22  04:47    


 


Mono % (Auto)  8.3 % (0.0-7.3)  H  05/10/22  04:47    


 


Eos % (Auto)  5.9 % (0.0-4.3)  H  05/10/22  04:47    


 


Baso % (Auto)  0.9 % (0.0-1.8)   05/10/22  04:47    


 


Lymph # (Auto)  0.7 K/mm3 (1.2-5.4)  L  05/10/22  04:47    


 


Mono # (Auto)  0.3 K/mm3 (0.0-0.8)   05/10/22  04:47    


 


Eos # (Auto)  0.2 K/mm3 (0.0-0.4)   05/10/22  04:47    


 


Baso # (Auto)  0.1 K/mm3 (0.0-0.1)   05/10/22  04:47    


 


Seg Neutrophils %  68.4 % (40.0-70.0)   05/10/22  04:47    


 


Seg Neutrophils #  2.9 K/mm3 (1.8-7.7)   05/10/22  04:47    


 


PT  15.1 Sec. (12.2-14.9)  H  05/09/22  23:26    


 


INR  1.07  (0.87-1.13)   05/09/22  23:26    


 


APTT  30.8 Sec. (24.2-36.6)   05/09/22  23:26    


 


ABG pH  7.448  (7.320-7.450)   05/09/22  01:30    


 


POC ABG pCO2  31.9 mmHg (32.0-48.0)  L  05/09/22  01:30    


 


POC ABG pO2  53.3 mmHg ()  L  05/09/22  01:30    


 


POC ABG HCO3  21.6   05/09/22  01:30    


 


ABG O2 Saturation  88.6  (0-100)   05/09/22  01:30    


 


POC ABG Base Excess  -1.8   05/09/22  01:30    


 


ABG Hemoglobin  10.9  (12.0-17.5)  L  05/09/22  01:30    


 


ABG Oxyhemoglobin  87.9  (94-98)  L  05/09/22  01:30    


 


ABG Methemoglobin  0.3  (0.0-1.5)   05/09/22  01:30    


 


ABG Sodium  Not Reportable   05/09/22  01:30    


 


ABG Potassium  Not Reportable   05/09/22  01:30    


 


ABG Chloride  Not Reportable   05/09/22  01:30    


 


ABG Glucose  Not Reportable   05/09/22  01:30    


 


Carboxyhemoglobin  0.5  (0.5-1.5)   05/09/22  01:30    


 


FiO2 %  21.0   05/09/22  01:30    


 


Sodium  137 mmol/L (137-145)   05/10/22  04:47    


 


Potassium  3.6 mmol/L (3.6-5.0)   05/10/22  04:47    


 


Chloride  98.7 mmol/L ()   05/10/22  04:47    


 


Carbon Dioxide  25 mmol/L (22-30)   05/10/22  04:47    


 


Anion Gap  17 mmol/L  05/10/22  04:47    


 


BUN  43 mg/dL (9-20)  H  05/10/22  04:47    


 


Creatinine  8.6 mg/dL (0.8-1.3)  H  05/10/22  04:47    


 


Estimated GFR  8 ml/min  05/10/22  04:47    


 


BUN/Creatinine Ratio  5 %  05/10/22  04:47    


 


Glucose  77 mg/dL ()   05/10/22  04:47    


 


Calcium  8.4 mg/dL (8.4-10.2)   05/10/22  04:47    


 


Magnesium  2.60 mg/dL (1.7-2.3)  H  05/09/22  00:13    


 


Total Bilirubin  0.60 mg/dL (0.1-1.2)   05/09/22  00:13    


 


AST  13 units/L (5-40)   05/09/22  00:13    


 


ALT  12 units/L (7-56)   05/09/22  00:13    


 


Alkaline Phosphatase  152 units/L ()  H  05/09/22  00:13    


 


Total Creatine Kinase  329 units/L ()  H  05/09/22  00:13    


 


Troponin T  0.075 ng/mL (0.00-0.029)  H  05/09/22  00:13    


 


NT-Pro-B Natriuret Pep  86298 pg/mL (0-450)  H  05/09/22  00:13    


 


Total Protein  6.9 g/dL (6.3-8.2)   05/09/22  00:13    


 


Albumin  4.1 g/dL (3.9-5)   05/09/22  00:13    


 


Albumin/Globulin Ratio  1.5 %  05/09/22  00:13    


 


Triglycerides  46 mg/dL (2-149)   05/09/22  00:13    


 


Cholesterol  140 mg/dL ()   05/09/22  00:13    


 


LDL Cholesterol Direct  40 mg/dL ()  L  05/09/22  00:13    


 


HDL Cholesterol  88 mg/dL (40-59)  H  05/09/22  00:13    


 


Cholesterol/HDL Ratio  1.59 %  05/09/22  00:13    


 


Arterial Blood Glucose  Not Reportable   05/09/22  01:30    


 


Hepatitis A IgM Ab  Non-reactive  (NonReactive)   05/09/22  11:27    


 


Hep Bs Antigen  Non-reactive  (Negative)   05/09/22  11:27    


 


Hep B Core IgM Ab  Non-reactive  (NonReactive)   05/09/22  11:27    


 


Hepatitis C Antibody  Non-reactive  (NonReactive)   05/09/22  11:27    











Steel/IV: 


                                        





Voiding Method                   Urinal











Active Medications





- Current Medications


Current Medications: 














Generic Name Dose Route Start Last Admin





  Trade Name Freq  PRN Reason Stop Dose Admin


 


Acetaminophen  650 mg  05/09/22 01:38 





  Acetaminophen 325 Mg Tab  PO  





  Q4H PRN  





  Pain MILD(1-3)/Fever >100.5/HA  


 


Albuterol  2.5 mg  05/09/22 01:38 





  Albuterol 2.5 Mg/3 Ml Nebu  IH  





  Q3HRT PRN  





  Shortness Of Breath  


 


Albuterol/Ipratropium  1 ampul  05/09/22 02:00  05/10/22 08:43





  Ipratropium/Albuterol Sulfate 3 Ml Ampul.Neb  IH   1 ampul





  Q6HRT NANCY   Administration


 


Apixaban  5 mg  05/09/22 22:00  05/09/22 21:46





  Apixaban 5 Mg Tab  PO   5 mg





  Q12HR NANCY   Administration





  Protocol  


 


Carvedilol  6.25 mg  05/09/22 10:00  05/09/22 21:47





  Carvedilol 6.25 Mg Tab  PO   6.25 mg





  BID NANCY   Administration


 


Clopidogrel Bisulfate  75 mg  05/10/22 10:00 





  Clopidogrel 75 Mg Tab  PO  





  QDAY [facility]  


 


Doxazosin Mesylate  4 mg  05/09/22 22:00  05/09/22 21:46





  Doxazosin 4 Mg Tab  PO   4 mg





  BID NANCY   Administration


 


Epoetin Ranjeet-epbx  20,000 unit  05/09/22 08:10 





  Epoetin Ranjeet-Epbx 20,000 Unit/1 Ml Vial  SUB-Q  





  DEONDRE PRN  





  hemodialysis  


 


Famotidine  20 mg  05/10/22 10:00 





  Famotidine 20 Mg Tab  PO  





  DAILY [facility]  


 


Heparin Sodium (Porcine)  3,000 unit  05/09/22 08:10 





  Heparin 10,000 Units/10 Ml Vial  IV  





  DEONDRE PRN  





  hemodialysis  


 


Hydralazine HCl  50 mg  05/09/22 14:00  05/10/22 06:10





  Hydralazine 25 Mg Tab  PO   50 mg





  Q8HR NANCY   Administration


 


Hydralazine HCl  10 mg  05/09/22 18:21  05/10/22 08:17





  Hydralazine 20 Mg/1 Ml Inj  IV   10 mg





  Q4HR PRN   Administration





  SBP >/=160; DBP >/=100  


 


Hydromorphone HCl  0.5 mg  05/09/22 01:38 





  Hydromorphone 1 Mg/1 Ml Inj  IV  





  Q3H PRN  





  Pain , Severe (7-10)  


 


Nitroglycerin/Dextrose  50 mg in 250 mls @ 3 mls/hr  05/09/22 02:00  05/10/22 

07:52





  Tridil Drip 50mg/250ml  IV   0 mcg/min





  TITR NANCY   0 mls/hr





    Titration





  Protocol  





  10 MCG/MIN  


 


Sodium Chloride  100 mls @ 999 mls/hr  05/09/22 08:07 





  Nacl 0.9%  IV  





  DEONDRE PRN  





  Hypotension  


 


Isosorbide Mononitrate  60 mg  05/10/22 10:00 





  Isosorbide Mononitrate Er 60 Mg Tab  PO  





  QDAY [facility]  


 


Morphine Sulfate  2 mg  05/09/22 01:38 





  Morphine 2 Mg/1 Ml Inj  IV  





  Q4H PRN  





  Pain, Moderate (4-6)  


 


Nifedipine  90 mg  05/10/22 10:00 





  Nifedipine Xl 90 Mg Tab  PO  





  QDAY [facility]  


 


Ondansetron HCl  4 mg  05/09/22 01:38 





  Ondansetron 4 Mg/2 Ml Inj  IV  





  Q8H PRN  





  Nausea And Vomiting  


 


Sodium Chloride  10 ml  05/09/22 10:00  05/09/22 21:49





  Sodium Chloride 0.9% 10 Ml Flush Syringe  IV   10 ml





  BID NANCY   Administration


 


Sodium Chloride  10 ml  05/09/22 01:38 





  Sodium Chloride 0.9% 10 Ml Flush Syringe  IV  





  PRN PRN  





  LINE FLUSH  














<CHANEL MIRANDA - Last Filed: 05/11/22 07:14>





Assessment and Plan


Assessment and plan: 


I saw and evaluated the patient. I agree with the findings and the plan of care 

as documented in the Nurse Practitioner's~note, with the following corrections 

and additions.











Hospitalist Physical





- Constitutional


Vitals: 


                                        











Temp Pulse Resp BP Pulse Ox


 


 98.3 F   74   20   157/87   100 


 


 05/11/22 04:27  05/11/22 04:27  05/11/22 04:27  05/11/22 04:27  05/11/22 04:27














HEART Score





- HEART Score


Troponin: 


                                        











Troponin T  0.075 ng/mL (0.00-0.029)  H  05/09/22  00:13    














Results





- Labs


CBC & Chem 7: 


                                 05/11/22 04:53





                                 05/11/22 04:53


Labs: 


                             Laboratory Last Values











WBC  5.5 K/mm3 (4.5-11.0)   05/11/22  04:53    


 


RBC  2.61 M/mm3 (3.65-5.03)  L  05/11/22  04:53    


 


Hgb  7.2 gm/dl (11.8-15.2)  L  05/11/22  04:53    


 


Hct  21.6 % (35.5-45.6)  L  05/11/22  04:53    


 


MCV  83 fl (84-94)  L  05/11/22  04:53    


 


MCH  28 pg (28-32)   05/11/22  04:53    


 


MCHC  33 % (32-34)   05/11/22  04:53    


 


RDW  20.3 % (13.2-15.2)  H  05/11/22  04:53    


 


Plt Count  114 K/mm3 (140-440)  L  05/11/22  04:53    


 


Lymph % (Auto)  15.2 % (13.4-35.0)   05/10/22  04:47    


 


Mono % (Auto)  8.3 % (0.0-7.3)  H  05/10/22  04:47    


 


Eos % (Auto)  5.9 % (0.0-4.3)  H  05/10/22  04:47    


 


Baso % (Auto)  0.9 % (0.0-1.8)   05/10/22  04:47    


 


Lymph # (Auto)  0.7 K/mm3 (1.2-5.4)  L  05/10/22  04:47    


 


Mono # (Auto)  0.3 K/mm3 (0.0-0.8)   05/10/22  04:47    


 


Eos # (Auto)  0.2 K/mm3 (0.0-0.4)   05/10/22  04:47    


 


Baso # (Auto)  0.1 K/mm3 (0.0-0.1)   05/10/22  04:47    


 


Seg Neutrophils %  68.4 % (40.0-70.0)   05/10/22  04:47    


 


Seg Neutrophils #  2.9 K/mm3 (1.8-7.7)   05/10/22  04:47    


 


PT  15.1 Sec. (12.2-14.9)  H  05/09/22  23:26    


 


INR  1.07  (0.87-1.13)   05/09/22  23:26    


 


APTT  30.8 Sec. (24.2-36.6)   05/09/22  23:26    


 


ABG pH  7.448  (7.320-7.450)   05/09/22  01:30    


 


POC ABG pCO2  31.9 mmHg (32.0-48.0)  L  05/09/22  01:30    


 


POC ABG pO2  53.3 mmHg ()  L  05/09/22  01:30    


 


POC ABG HCO3  21.6   05/09/22  01:30    


 


ABG O2 Saturation  88.6  (0-100)   05/09/22  01:30    


 


POC ABG Base Excess  -1.8   05/09/22  01:30    


 


ABG Hemoglobin  10.9  (12.0-17.5)  L  05/09/22  01:30    


 


ABG Oxyhemoglobin  87.9  (94-98)  L  05/09/22  01:30    


 


ABG Methemoglobin  0.3  (0.0-1.5)   05/09/22  01:30    


 


ABG Sodium  Not Reportable   05/09/22  01:30    


 


ABG Potassium  Not Reportable   05/09/22  01:30    


 


ABG Chloride  Not Reportable   05/09/22  01:30    


 


ABG Glucose  Not Reportable   05/09/22  01:30    


 


Carboxyhemoglobin  0.5  (0.5-1.5)   05/09/22  01:30    


 


FiO2 %  21.0   05/09/22  01:30    


 


Sodium  137 mmol/L (137-145)   05/11/22  04:53    


 


Potassium  3.8 mmol/L (3.6-5.0)   05/11/22  04:53    


 


Chloride  97.3 mmol/L ()  L  05/11/22  04:53    


 


Carbon Dioxide  23 mmol/L (22-30)   05/11/22  04:53    


 


Anion Gap  21 mmol/L  05/11/22  04:53    


 


BUN  64 mg/dL (9-20)  H  05/11/22  04:53    


 


Creatinine  10.8 mg/dL (0.8-1.3)  H  05/11/22  04:53    


 


Estimated GFR  6 ml/min  05/11/22  04:53    


 


BUN/Creatinine Ratio  6 %  05/11/22  04:53    


 


Glucose  73 mg/dL ()  L  05/11/22  04:53    


 


POC Glucose  74 mg/dL ()   05/10/22  11:33    


 


Calcium  8.4 mg/dL (8.4-10.2)   05/11/22  04:53    


 


Phosphorus  4.50 mg/dL (2.5-4.5)   05/11/22  04:53    


 


Magnesium  2.30 mg/dL (1.7-2.3)   05/11/22  04:53    


 


Total Bilirubin  0.60 mg/dL (0.1-1.2)   05/09/22  00:13    


 


AST  13 units/L (5-40)   05/09/22  00:13    


 


ALT  12 units/L (7-56)   05/09/22  00:13    


 


Alkaline Phosphatase  152 units/L ()  H  05/09/22  00:13    


 


Total Creatine Kinase  329 units/L ()  H  05/09/22  00:13    


 


Troponin T  0.075 ng/mL (0.00-0.029)  H  05/09/22  00:13    


 


NT-Pro-B Natriuret Pep  60721 pg/mL (0-450)  H  05/09/22  00:13    


 


Total Protein  6.9 g/dL (6.3-8.2)   05/09/22  00:13    


 


Albumin  4.1 g/dL (3.9-5)   05/09/22  00:13    


 


Albumin/Globulin Ratio  1.5 %  05/09/22  00:13    


 


Triglycerides  46 mg/dL (2-149)   05/09/22  00:13    


 


Cholesterol  140 mg/dL ()   05/09/22  00:13    


 


LDL Cholesterol Direct  40 mg/dL ()  L  05/09/22  00:13    


 


HDL Cholesterol  88 mg/dL (40-59)  H  05/09/22  00:13    


 


Cholesterol/HDL Ratio  1.59 %  05/09/22  00:13    


 


Arterial Blood Glucose  Not Reportable   05/09/22  01:30    


 


Hepatitis A IgM Ab  Non-reactive  (NonReactive)   05/09/22  11:27    


 


Hep Bs Antigen  Non-reactive  (Negative)   05/09/22  11:27    


 


Hep B Core IgM Ab  Non-reactive  (NonReactive)   05/09/22  11:27    


 


Hepatitis C Antibody  Non-reactive  (NonReactive)   05/09/22  11:27    











Steel/IV: 


                                        





Voiding Method                   Urinal











Active Medications





- Current Medications


Current Medications: 














Generic Name Dose Route Start Last Admin





  Trade Name Freq  PRN Reason Stop Dose Admin


 


Acetaminophen  650 mg  05/09/22 01:38 





  Acetaminophen 325 Mg Tab  PO  





  Q4H PRN  





  Pain MILD(1-3)/Fever >100.5/HA  


 


Albuterol  2.5 mg  05/09/22 01:38 





  Albuterol 2.5 Mg/3 Ml Nebu  IH  





  Q3HRT PRN  





  Shortness Of Breath  


 


Apixaban  5 mg  05/09/22 22:00  05/10/22 22:27





  Apixaban 5 Mg Tab  PO   5 mg





  Q12HR NANCY   Administration





  Protocol  


 


Carvedilol  6.25 mg  05/09/22 10:00  05/10/22 22:27





  Carvedilol 6.25 Mg Tab  PO   6.25 mg





  BID NANCY   Administration


 


Clopidogrel Bisulfate  75 mg  05/10/22 10:00  05/10/22 10:14





  Clopidogrel 75 Mg Tab  PO   75 mg





  QDAY NANCY   Administration


 


Doxazosin Mesylate  4 mg  05/09/22 22:00  05/10/22 22:26





  Doxazosin 4 Mg Tab  PO   4 mg





  BID NANCY   Administration


 


Epoetin Ranjeet-epbx  20,000 unit  05/09/22 08:10 





  Epoetin Ranjeet-Epbx 20,000 Unit/1 Ml Vial  SUB-Q  





  DEONDRE PRN  





  hemodialysis  


 


Famotidine  20 mg  05/10/22 10:00  05/10/22 09:12





  Famotidine 20 Mg Tab  PO   20 mg





  DAILY NANCY   Administration


 


Heparin Sodium (Porcine)  3,000 unit  05/09/22 08:10 





  Heparin 10,000 Units/10 Ml Vial  IV  





  DEONDRE PRN  





  hemodialysis  


 


Hydralazine HCl  10 mg  05/09/22 18:21  05/10/22 08:17





  Hydralazine 20 Mg/1 Ml Inj  IV   10 mg





  Q4HR PRN   Administration





  SBP >/=160; DBP >/=100  


 


Hydralazine HCl  50 mg  05/10/22 12:00  05/11/22 05:28





  Hydralazine 25 Mg Tab  PO   50 mg





  Q6HR NANCY   Administration


 


Sodium Chloride  100 mls @ 999 mls/hr  05/09/22 08:07 





  Nacl 0.9%  IV  





  DEONDRE PRN  





  Hypotension  


 


Isosorbide Mononitrate  60 mg  05/10/22 10:00  05/10/22 09:12





  Isosorbide Mononitrate Er 60 Mg Tab  PO   60 mg





  QDAY NANCY   Administration


 


Nifedipine  90 mg  05/10/22 10:00  05/10/22 09:12





  Nifedipine Xl 90 Mg Tab  PO   90 mg





  QDAY NANCY   Administration


 


Ondansetron HCl  4 mg  05/09/22 01:38 





  Ondansetron 4 Mg/2 Ml Inj  IV  





  Q8H PRN  





  Nausea And Vomiting  


 


Sodium Chloride  10 ml  05/09/22 10:00  05/10/22 22:28





  Sodium Chloride 0.9% 10 Ml Flush Syringe  IV   10 ml





  BID NANCY   Administration


 


Sodium Chloride  10 ml  05/09/22 01:38 





  Sodium Chloride 0.9% 10 Ml Flush Syringe  IV  





  PRN PRN  





  LINE FLUSH

## 2022-05-10 NOTE — PROGRESS NOTE
Assessment and Plan


1. ESRD:


Patient is on maintenance hemodialysis, MWF schedule.


Last outpatient HD 5/6.


Hemodialysis: 5/9.


 


2. FEN:


Volume overload, UF with HD, monitor.


Counseled to limit fluid intake.


Monitor lytes and volume status.





3. Acute hypoxic respiratory failure, POA:


2/2 Acute pulmonary edema in the setting of volume overload.


Echo with normal EF.


Volume control thru HD.


Supplemental O2, weaned to NC O2.


Improving.





4. Hx of DVT.





5. Hypertension:


Continue home meds.


Volume control thru HD.


Follow BP.





6. Anemia, POA:


Chronic.


Epogen with HD.


Monitor.





7. Hemorrhage from tongue, POA:


Stable. Monitor.





8. Brugada syndrome.











Subjective:


Patient was seen and examined at the bedside.


Doing better.











Examination:


General appearance: well-developed, appears stated age, thin built, no distress 

noted, NC O2


HEENT: ADDY


Neck: trachea midline


Respiratory: ctab


Heart: S1S2, no murmur


Abdomen: soft, bowel sounds heard, NT, no palpable mass


Integumentary: no obvious rash


Neurologic: AO, non-focal


Ext: no edema


Hemodialysis access: L arm AVF, L IJ tunnel catheter








Subjective


Date of service: 05/10/22





Objective





- Vital Signs


Vital signs: 


                               Vital Signs - 12hr











  05/09/22 05/09/22 05/09/22





  20:10 20:20 20:24


 


Pulse Rate 78 78 


 


Pulse Rate [   81





Anterior   





Bilateral   





Throughout]   


 


Pulse Rate [   





From Monitor]   


 


Respiratory 22 25 H 





Rate   


 


Respiratory   22





Rate [Anterior   





Bilateral   





Throughout]   


 


Blood Pressure 143/76 144/74 


 


O2 Sat by Pulse 99 98 





Oximetry   














  05/09/22 05/09/22 05/09/22





  20:30 20:40 20:50


 


Pulse Rate 77 83 81


 


Pulse Rate [   





Anterior   





Bilateral   





Throughout]   


 


Pulse Rate [   





From Monitor]   


 


Respiratory 22 25 H 27 H





Rate   


 


Respiratory   





Rate [Anterior   





Bilateral   





Throughout]   


 


Blood Pressure 143/80 143/80 154/85


 


O2 Sat by Pulse 98 100 99





Oximetry   














  05/09/22 05/09/22 05/09/22





  21:00 21:10 21:20


 


Pulse Rate 86 83 90


 


Pulse Rate [   





Anterior   





Bilateral   





Throughout]   


 


Pulse Rate [   





From Monitor]   


 


Respiratory 25 H 28 H 18





Rate   


 


Respiratory   





Rate [Anterior   





Bilateral   





Throughout]   


 


Blood Pressure 154/85 156/93 172/104


 


O2 Sat by Pulse 94 99 97





Oximetry   














  05/09/22 05/09/22 05/09/22





  21:30 21:40 21:46


 


Pulse Rate 90 99 H 91 H


 


Pulse Rate [   





Anterior   





Bilateral   





Throughout]   


 


Pulse Rate [   





From Monitor]   


 


Respiratory 18 20 





Rate   


 


Respiratory   





Rate [Anterior   





Bilateral   





Throughout]   


 


Blood Pressure 180/105 180/105 173/97


 


O2 Sat by Pulse 95 93 





Oximetry   














  05/09/22 05/09/22 05/09/22





  21:47 21:48 21:50


 


Pulse Rate 94 H 94 H 96 H


 


Pulse Rate [   





Anterior   





Bilateral   





Throughout]   


 


Pulse Rate [   





From Monitor]   


 


Respiratory   19





Rate   


 


Respiratory   





Rate [Anterior   





Bilateral   





Throughout]   


 


Blood Pressure 173/97 173/97 173/97


 


O2 Sat by Pulse   95





Oximetry   














  05/09/22 05/09/22 05/09/22





  22:00 22:10 22:14


 


Pulse Rate 94 H 90 89


 


Pulse Rate [   





Anterior   





Bilateral   





Throughout]   


 


Pulse Rate [   





From Monitor]   


 


Respiratory 16 18 21





Rate   


 


Respiratory   





Rate [Anterior   





Bilateral   





Throughout]   


 


Blood Pressure 185/104 185/104 185/104


 


O2 Sat by Pulse 87 92 93





Oximetry   














  05/09/22 05/09/22 05/09/22





  22:20 22:30 22:40


 


Pulse Rate 89 87 90


 


Pulse Rate [   





Anterior   





Bilateral   





Throughout]   


 


Pulse Rate [   





From Monitor]   


 


Respiratory 20 18 31 H





Rate   


 


Respiratory   





Rate [Anterior   





Bilateral   





Throughout]   


 


Blood Pressure 188/99 179/100 179/100


 


O2 Sat by Pulse 92 90 96





Oximetry   














  05/09/22 05/09/22 05/09/22





  22:50 23:00 23:10


 


Pulse Rate 85 87 85


 


Pulse Rate [   





Anterior   





Bilateral   





Throughout]   


 


Pulse Rate [   





From Monitor]   


 


Respiratory 25 H 29 H 18





Rate   


 


Respiratory   





Rate [Anterior   





Bilateral   





Throughout]   


 


Blood Pressure 168/98 163/102 163/102


 


O2 Sat by Pulse 98 97 97





Oximetry   














  05/09/22 05/09/22 05/09/22





  23:20 23:30 23:41


 


Pulse Rate 83 79 77


 


Pulse Rate [   





Anterior   





Bilateral   





Throughout]   


 


Pulse Rate [   





From Monitor]   


 


Respiratory 23 36 H 26 H





Rate   


 


Respiratory   





Rate [Anterior   





Bilateral   





Throughout]   


 


Blood Pressure 157/83 155/82 


 


O2 Sat by Pulse 99 95 100





Oximetry   














  05/09/22 05/10/22 05/10/22





  23:51 00:00 00:11


 


Pulse Rate 77 75 75


 


Pulse Rate [   





Anterior   





Bilateral   





Throughout]   


 


Pulse Rate [   





From Monitor]   


 


Respiratory 21 25 H 19





Rate   


 


Respiratory   





Rate [Anterior   





Bilateral   





Throughout]   


 


Blood Pressure 158/81 144/80 144/80


 


O2 Sat by Pulse 98 96 100





Oximetry   














  05/10/22 05/10/22 05/10/22





  00:21 00:31 00:41


 


Pulse Rate 77 74 75


 


Pulse Rate [   





Anterior   





Bilateral   





Throughout]   


 


Pulse Rate [   





From Monitor]   


 


Respiratory 14 20 20





Rate   


 


Respiratory   





Rate [Anterior   





Bilateral   





Throughout]   


 


Blood Pressure 167/97 162/94 162/94


 


O2 Sat by Pulse 100 96 99





Oximetry   














  05/10/22 05/10/22 05/10/22





  00:51 01:00 01:11


 


Pulse Rate 77 82 77


 


Pulse Rate [   





Anterior   





Bilateral   





Throughout]   


 


Pulse Rate [   





From Monitor]   


 


Respiratory 23 20 23





Rate   


 


Respiratory   





Rate [Anterior   





Bilateral   





Throughout]   


 


Blood Pressure 168/97 160/100 160/100


 


O2 Sat by Pulse 89 85 91





Oximetry   














  05/10/22 05/10/22 05/10/22





  01:21 01:30 01:41


 


Pulse Rate 74 71 81


 


Pulse Rate [   





Anterior   





Bilateral   





Throughout]   


 


Pulse Rate [   





From Monitor]   


 


Respiratory 19 16 16





Rate   


 


Respiratory   





Rate [Anterior   





Bilateral   





Throughout]   


 


Blood Pressure 173/100 152/87 152/87


 


O2 Sat by Pulse 95 97 99





Oximetry   














  05/10/22 05/10/22 05/10/22





  01:51 02:00 02:11


 


Pulse Rate 75 75 75


 


Pulse Rate [   





Anterior   





Bilateral   





Throughout]   


 


Pulse Rate [   





From Monitor]   


 


Respiratory 17 20 20





Rate   


 


Respiratory   





Rate [Anterior   





Bilateral   





Throughout]   


 


Blood Pressure 160/91 157/88 157/88


 


O2 Sat by Pulse 100 96 100





Oximetry   














  05/10/22 05/10/22 05/10/22





  02:21 02:30 02:41


 


Pulse Rate 75 74 75


 


Pulse Rate [   





Anterior   





Bilateral   





Throughout]   


 


Pulse Rate [   





From Monitor]   


 


Respiratory 20 21 20





Rate   


 


Respiratory   





Rate [Anterior   





Bilateral   





Throughout]   


 


Blood Pressure 152/90 154/89 154/89


 


O2 Sat by Pulse 100 97 100





Oximetry   














  05/10/22 05/10/22 05/10/22





  02:51 03:00 03:11


 


Pulse Rate 76 74 74


 


Pulse Rate [   





Anterior   





Bilateral   





Throughout]   


 


Pulse Rate [   





From Monitor]   


 


Respiratory 20 22 20





Rate   


 


Respiratory   





Rate [Anterior   





Bilateral   





Throughout]   


 


Blood Pressure 156/94 164/90 164/90


 


O2 Sat by Pulse 99 86 99





Oximetry   














  05/10/22 05/10/22 05/10/22





  03:21 03:31 03:41


 


Pulse Rate 75 76 76


 


Pulse Rate [   





Anterior   





Bilateral   





Throughout]   


 


Pulse Rate [   





From Monitor]   


 


Respiratory 22 22 18





Rate   


 


Respiratory   





Rate [Anterior   





Bilateral   





Throughout]   


 


Blood Pressure 155/87 160/86 160/86


 


O2 Sat by Pulse 99 95 98





Oximetry   














  05/10/22 05/10/22 05/10/22





  03:51 04:00 04:11


 


Pulse Rate 82 72 78


 


Pulse Rate [   





Anterior   





Bilateral   





Throughout]   


 


Pulse Rate [  73 





From Monitor]   


 


Respiratory 15 19 21





Rate   


 


Respiratory   





Rate [Anterior   





Bilateral   





Throughout]   


 


Blood Pressure 172/98 176/97 176/97


 


O2 Sat by Pulse 100 97 100





Oximetry   














  05/10/22 05/10/22 05/10/22





  04:21 04:30 04:41


 


Pulse Rate 77 78 79


 


Pulse Rate [   





Anterior   





Bilateral   





Throughout]   


 


Pulse Rate [   





From Monitor]   


 


Respiratory 18 19 22





Rate   


 


Respiratory   





Rate [Anterior   





Bilateral   





Throughout]   


 


Blood Pressure 168/91 165/100 165/100


 


O2 Sat by Pulse 96 91 95





Oximetry   














  05/10/22 05/10/22 05/10/22





  04:51 05:00 05:11


 


Pulse Rate 77 78 74


 


Pulse Rate [   





Anterior   





Bilateral   





Throughout]   


 


Pulse Rate [   





From Monitor]   


 


Respiratory 19 22 23





Rate   


 


Respiratory   





Rate [Anterior   





Bilateral   





Throughout]   


 


Blood Pressure 173/99 169/100 169/100


 


O2 Sat by Pulse 100 97 100





Oximetry   














  05/10/22 05/10/22 05/10/22





  05:21 05:30 05:41


 


Pulse Rate 81 74 78


 


Pulse Rate [   





Anterior   





Bilateral   





Throughout]   


 


Pulse Rate [   





From Monitor]   


 


Respiratory 22 22 20





Rate   


 


Respiratory   





Rate [Anterior   





Bilateral   





Throughout]   


 


Blood Pressure 168/98 160/82 160/82


 


O2 Sat by Pulse 91 86 93





Oximetry   














  05/10/22 05/10/22 05/10/22





  05:51 06:00 06:10


 


Pulse Rate 88 79 76


 


Pulse Rate [   





Anterior   





Bilateral   





Throughout]   


 


Pulse Rate [   





From Monitor]   


 


Respiratory 21 19 





Rate   


 


Respiratory   





Rate [Anterior   





Bilateral   





Throughout]   


 


Blood Pressure 166/89 162/100 162/100


 


O2 Sat by Pulse 91 84 





Oximetry   














  05/10/22 05/10/22 05/10/22





  06:11 06:21 06:31


 


Pulse Rate 76 77 72


 


Pulse Rate [   





Anterior   





Bilateral   





Throughout]   


 


Pulse Rate [   





From Monitor]   


 


Respiratory 22 21 19





Rate   


 


Respiratory   





Rate [Anterior   





Bilateral   





Throughout]   


 


Blood Pressure 162/100 164/101 166/92


 


O2 Sat by Pulse 88 98 97





Oximetry   














  05/10/22 05/10/22 05/10/22





  06:41 06:51 07:01


 


Pulse Rate 75 74 78


 


Pulse Rate [   





Anterior   





Bilateral   





Throughout]   


 


Pulse Rate [   





From Monitor]   


 


Respiratory 20 22 24





Rate   


 


Respiratory   





Rate [Anterior   





Bilateral   





Throughout]   


 


Blood Pressure 166/92 168/101 167/103


 


O2 Sat by Pulse 98 100 98





Oximetry   














- Lab





                                 05/11/22 04:53





                                 05/11/22 04:53


                             Most recent lab results











ABG pH  7.448  (7.320-7.450)   05/09/22  01:30    


 


ABG O2 Saturation  88.6  (0-100)   05/09/22  01:30    


 


Calcium  8.4 mg/dL (8.4-10.2)   05/10/22  04:47    


 


Magnesium  2.60 mg/dL (1.7-2.3)  H  05/09/22  00:13    














Medications & Allergies





- Medications


Allergies/Adverse Reactions: 


                                    Allergies





No Known Allergies Allergy (Unverified 01/10/14 09:48)


   








Home Medications: 


                                Home Medications











 Medication  Instructions  Recorded  Confirmed  Last Taken  Type


 


carvediloL [Coreg] 6.25 mg PO BID #60 tablet 01/15/14 05/10/22 Unknown Rx


 


Clopidogrel [Plavix] 75 mg PO QDAY 05/09/22 05/09/22 Unknown History


 


Doxazosin [Cardura] 4 mg PO BID 05/09/22 05/09/22 Unknown History


 


Eliquis 5 mg PO BID 05/09/22 05/09/22 Unknown History


 


Isosorbide Mononitrate [Isosorbide 60 mg PO QDAY 05/09/22 05/09/22 Unknown 

History





Mononitrate ER]     


 


Nifedipine ER 90 mg PO QDAY 05/09/22 05/09/22 Unknown History


 


hydrALAZINE 100 mg PO BID 05/09/22 05/09/22 Unknown History











Active Medications: 














Generic Name Dose Route Start Last Admin





  Trade Name Freq  PRN Reason Stop Dose Admin


 


Acetaminophen  650 mg  05/09/22 01:38 





  Acetaminophen 325 Mg Tab  PO  





  Q4H PRN  





  Pain MILD(1-3)/Fever >100.5/HA  


 


Albuterol  2.5 mg  05/09/22 01:38 





  Albuterol 2.5 Mg/3 Ml Nebu  IH  





  Q3HRT PRN  





  Shortness Of Breath  


 


Albuterol/Ipratropium  1 ampul  05/09/22 02:00  05/10/22 01:35





  Ipratropium/Albuterol Sulfate 3 Ml Ampul.Neb  IH   Not Given





  Q6HRT NANCY  


 


Apixaban  5 mg  05/09/22 22:00  05/09/22 21:46





  Apixaban 5 Mg Tab  PO   5 mg





  Q12HR NANCY   Administration





  Protocol  


 


Carvedilol  6.25 mg  05/09/22 10:00  05/09/22 21:47





  Carvedilol 6.25 Mg Tab  PO   6.25 mg





  BID NANCY   Administration


 


Doxazosin Mesylate  4 mg  05/09/22 22:00  05/09/22 21:46





  Doxazosin 4 Mg Tab  PO   4 mg





  BID NANCY   Administration


 


Epoetin Ranjeet-epbx  20,000 unit  05/09/22 08:10 





  Epoetin Ranjeet-Epbx 20,000 Unit/1 Ml Vial  SUB-Q  





  DEONDRE PRN  





  hemodialysis  


 


Famotidine  20 mg  05/09/22 10:00  05/09/22 10:00





  Famotidine 20 Mg/2 Ml Inj  IV   20 mg





  DAILY NANCY   Administration


 


Heparin Sodium (Porcine)  3,000 unit  05/09/22 08:10 





  Heparin 10,000 Units/10 Ml Vial  IV  





  DEONDRE PRN  





  hemodialysis  


 


Hydralazine HCl  50 mg  05/09/22 14:00  05/10/22 06:10





  Hydralazine 25 Mg Tab  PO   50 mg





  Q8HR NANCY   Administration


 


Hydralazine HCl  10 mg  05/09/22 18:21  05/09/22 18:34





  Hydralazine 20 Mg/1 Ml Inj  IV   10 mg





  Q4HR PRN   Administration





  Hypertension  


 


Hydromorphone HCl  0.5 mg  05/09/22 01:38 





  Hydromorphone 1 Mg/1 Ml Inj  IV  





  Q3H PRN  





  Pain , Severe (7-10)  


 


Nitroglycerin/Dextrose  50 mg in 250 mls @ 3 mls/hr  05/09/22 02:00  05/10/22 

07:52





  Tridil Drip 50mg/250ml  IV   0 mcg/min





  TITR NANCY   0 mls/hr





    Titration





  Protocol  





  10 MCG/MIN  


 


Sodium Chloride  100 mls @ 999 mls/hr  05/09/22 08:07 





  Nacl 0.9%  IV  





  DEONDRE PRN  





  Hypotension  


 


Isosorbide Mononitrate  60 mg  05/10/22 10:00 





  Isosorbide Mononitrate Er 60 Mg Tab  PO  





  QDAY [facility]  


 


Morphine Sulfate  2 mg  05/09/22 01:38 





  Morphine 2 Mg/1 Ml Inj  IV  





  Q4H PRN  





  Pain, Moderate (4-6)  


 


Nifedipine  90 mg  05/10/22 10:00 





  Nifedipine Xl 90 Mg Tab  PO  





  QDAY [facility]  


 


Ondansetron HCl  4 mg  05/09/22 01:38 





  Ondansetron 4 Mg/2 Ml Inj  IV  





  Q8H PRN  





  Nausea And Vomiting  


 


Sodium Chloride  10 ml  05/09/22 10:00  05/09/22 21:49





  Sodium Chloride 0.9% 10 Ml Flush Syringe  IV   10 ml





  BID NANCY   Administration


 


Sodium Chloride  10 ml  05/09/22 01:38 





  Sodium Chloride 0.9% 10 Ml Flush Syringe  IV  





  PRN PRN  





  LINE FLUSH

## 2022-05-11 NOTE — PROGRESS NOTE
Assessment and Plan





Will sign off.  Call if questions.





Subjective


Date of service: 05/11/22


Interval history: 





Successful transfer out of ICU.  Stable on floor.





Objective





- Constitutional


Vitals: 


                               Vital Signs - 12hr











  05/10/22 05/10/22 05/10/22





  22:00 22:17 22:26


 


Temperature  98.1 F 


 


Pulse Rate 90 90 90


 


Respiratory 18 20 





Rate   


 


Blood Pressure  156/86 156/86


 


O2 Sat by Pulse 98 100 





Oximetry   














  05/10/22 05/11/22





  22:27 04:27


 


Temperature  98.3 F


 


Pulse Rate 90 74


 


Respiratory  20





Rate  


 


Blood Pressure 156/86 157/87


 


O2 Sat by Pulse  100





Oximetry  














- Labs


CBC & Chem 7: 


                                 05/11/22 04:53





                                 05/11/22 04:53


Labs: 


                              Abnormal lab results











  05/11/22 05/11/22 Range/Units





  04:53 04:53 


 


RBC  2.61 L   (3.65-5.03)  M/mm3


 


Hgb  7.2 L   (11.8-15.2)  gm/dl


 


Hct  21.6 L   (35.5-45.6)  %


 


MCV  83 L   (84-94)  fl


 


RDW  20.3 H   (13.2-15.2)  %


 


Plt Count  114 L   (140-440)  K/mm3


 


Chloride   97.3 L  ()  mmol/L


 


BUN   64 H  (9-20)  mg/dL


 


Creatinine   10.8 H  (0.8-1.3)  mg/dL


 


Glucose   73 L  ()  mg/dL














Medications & Allergies





- Medications


Allergies/Adverse Reactions: 


                                    Allergies





No Known Allergies Allergy (Unverified 01/10/14 09:48)


   








Home Medications: 


                                Home Medications











 Medication  Instructions  Recorded  Confirmed  Last Taken  Type


 


carvediloL [Coreg] 6.25 mg PO BID #60 tablet 01/15/14 05/10/22 Unknown Rx


 


Clopidogrel [Plavix] 75 mg PO QDAY 05/09/22 05/09/22 Unknown History


 


Doxazosin [Cardura] 4 mg PO BID 05/09/22 05/09/22 Unknown History


 


Eliquis 5 mg PO BID 05/09/22 05/09/22 Unknown History


 


Isosorbide Mononitrate [Isosorbide 60 mg PO QDAY 05/09/22 05/09/22 Unknown 

History





Mononitrate ER]     


 


Nifedipine ER 90 mg PO QDAY 05/09/22 05/09/22 Unknown History


 


hydrALAZINE 100 mg PO BID 05/09/22 05/09/22 Unknown History











Active Medications: 














Generic Name Dose Route Start Last Admin





  Trade Name Freq  PRN Reason Stop Dose Admin


 


Acetaminophen  650 mg  05/09/22 01:38 





  Acetaminophen 325 Mg Tab  PO  





  Q4H PRN  





  Pain MILD(1-3)/Fever >100.5/HA  


 


Albuterol  2.5 mg  05/09/22 01:38 





  Albuterol 2.5 Mg/3 Ml Nebu  IH  





  Q3HRT PRN  





  Shortness Of Breath  


 


Apixaban  5 mg  05/09/22 22:00  05/10/22 22:27





  Apixaban 5 Mg Tab  PO   5 mg





  Q12HR NANCY   Administration





  Protocol  


 


Carvedilol  6.25 mg  05/09/22 10:00  05/10/22 22:27





  Carvedilol 6.25 Mg Tab  PO   6.25 mg





  BID NANCY   Administration


 


Clopidogrel Bisulfate  75 mg  05/10/22 10:00  05/10/22 10:14





  Clopidogrel 75 Mg Tab  PO   75 mg





  QDAY NANCY   Administration


 


Doxazosin Mesylate  4 mg  05/09/22 22:00  05/10/22 22:26





  Doxazosin 4 Mg Tab  PO   4 mg





  BID NANCY   Administration


 


Epoetin Ranjeet-epbx  20,000 unit  05/09/22 08:10 





  Epoetin Ranjeet-Epbx 20,000 Unit/1 Ml Vial  SUB-Q  





  DEONDRE PRN  





  hemodialysis  


 


Famotidine  20 mg  05/10/22 10:00  05/10/22 09:12





  Famotidine 20 Mg Tab  PO   20 mg





  DAILY NANCY   Administration


 


Heparin Sodium (Porcine)  3,000 unit  05/09/22 08:10 





  Heparin 10,000 Units/10 Ml Vial  IV  





  DEONDRE PRN  





  hemodialysis  


 


Hydralazine HCl  10 mg  05/09/22 18:21  05/10/22 08:17





  Hydralazine 20 Mg/1 Ml Inj  IV   10 mg





  Q4HR PRN   Administration





  SBP >/=160; DBP >/=100  


 


Hydralazine HCl  50 mg  05/10/22 12:00  05/11/22 05:28





  Hydralazine 25 Mg Tab  PO   50 mg





  Q6HR NANCY   Administration


 


Sodium Chloride  100 mls @ 999 mls/hr  05/09/22 08:07 





  Nacl 0.9%  IV  





  DEONDRE PRN  





  Hypotension  


 


Desmopressin Acetate 20 mcg/  55 mls @ 100 mls/hr  05/11/22 09:30 





  Sodium Chloride  IV  05/11/22 10:02 





  ONCE ONE  


 


Sodium Chloride  500 mls @ 0 mls/hr  05/11/22 08:57 





  Nacl 0.9% 500 Ml  IV  05/11/22 08:58 





  ONCE ONE  





  As Directed  


 


Isosorbide Mononitrate  60 mg  05/10/22 10:00  05/10/22 09:12





  Isosorbide Mononitrate Er 60 Mg Tab  PO   60 mg





  QDAY NANCY   Administration


 


Nifedipine  90 mg  05/10/22 10:00  05/10/22 09:12





  Nifedipine Xl 90 Mg Tab  PO   90 mg





  QDAY NANCY   Administration


 


Ondansetron HCl  4 mg  05/09/22 01:38 





  Ondansetron 4 Mg/2 Ml Inj  IV  





  Q8H PRN  





  Nausea And Vomiting  


 


Sodium Chloride  10 ml  05/09/22 10:00  05/10/22 22:28





  Sodium Chloride 0.9% 10 Ml Flush Syringe  IV   10 ml





  BID NANCY   Administration


 


Sodium Chloride  10 ml  05/09/22 01:38 





  Sodium Chloride 0.9% 10 Ml Flush Syringe  IV  





  PRN PRN  





  LINE FLUSH  














HEART Score





- HEART Score


Troponin: 


                                        











Troponin T  0.075 ng/mL (0.00-0.029)  H  05/09/22  00:13

## 2022-05-11 NOTE — PROGRESS NOTE
Assessment and Plan


1. ESRD:


Patient is on maintenance hemodialysis, MWF schedule.


Last outpatient HD 5/6.


Hemodialysis: 5/9, 5/11.


 


2. FEN:


Volume overload, UF with HD, monitor.


Counseled to limit fluid intake.


Monitor lytes and volume status.





3. Acute hypoxic respiratory failure, POA:


2/2 Acute pulmonary edema in the setting of volume overload.


Echo with normal EF.


Volume control thru HD.


Supplemental O2 as needed.





4. Hx of DVT.





5. Hypertension:


Continue home meds.


Volume control thru HD.


Follow BP.





6. Anemia, POA:


Chronic.


Epogen with HD.


Monitor.





7. Hemorrhage from tongue, POA:


DDAVP today.


Avoid Heparin.


Monitor.





8. Brugada syndrome.











Subjective:


Patient was seen and examined at the bedside.


Doing ok.











Examination:


General appearance: well-developed, appears stated age, thin built, not in 

distress


HEENT: ADDY


Neck: trachea midline


Respiratory: ctab


Heart: S1S2, no murmur


Abdomen: soft, bowel sounds heard, NT, no palpable mass


Integumentary: no obvious rash


Neurologic: AO, non-focal


Ext: no edema


Hemodialysis access: L arm AVF, L IJ tunnel catheter








Subjective


Date of service: 05/11/22





Objective





- Vital Signs


Vital signs: 


                               Vital Signs - 12hr











  05/11/22 05/11/22 05/11/22





  04:27 09:39 09:40


 


Temperature 98.3 F  


 


Pulse Rate 74 72 72


 


Respiratory 20  





Rate   


 


Blood Pressure 157/87 162/91 162/91


 


O2 Sat by Pulse 100  





Oximetry   














  05/11/22





  10:21


 


Temperature 


 


Pulse Rate 72


 


Respiratory 





Rate 


 


Blood Pressure 162/91


 


O2 Sat by Pulse 





Oximetry 














- Lab





                                 05/11/22 04:53





                                 05/11/22 04:53


                             Most recent lab results











ABG pH  7.448  (7.320-7.450)   05/09/22  01:30    


 


ABG O2 Saturation  88.6  (0-100)   05/09/22  01:30    


 


Calcium  8.4 mg/dL (8.4-10.2)   05/11/22  04:53    


 


Phosphorus  4.50 mg/dL (2.5-4.5)   05/11/22  04:53    


 


Magnesium  2.30 mg/dL (1.7-2.3)   05/11/22  04:53    














Medications & Allergies





- Medications


Allergies/Adverse Reactions: 


                                    Allergies





No Known Allergies Allergy (Unverified 01/10/14 09:48)


   








Home Medications: 


                                Home Medications











 Medication  Instructions  Recorded  Confirmed  Last Taken  Type


 


carvediloL [Coreg] 6.25 mg PO BID #60 tablet 01/15/14 05/10/22 Unknown Rx


 


Clopidogrel [Plavix] 75 mg PO QDAY 05/09/22 05/09/22 Unknown History


 


Doxazosin [Cardura] 4 mg PO BID 05/09/22 05/09/22 Unknown History


 


Eliquis 5 mg PO BID 05/09/22 05/09/22 Unknown History


 


Isosorbide Mononitrate [Isosorbide 60 mg PO QDAY 05/09/22 05/09/22 Unknown 

History





Mononitrate ER]     


 


Nifedipine ER 90 mg PO QDAY 05/09/22 05/09/22 Unknown History


 


hydrALAZINE 100 mg PO BID 05/09/22 05/09/22 Unknown History











Active Medications: 














Generic Name Dose Route Start Last Admin





  Trade Name Freq  PRN Reason Stop Dose Admin


 


Acetaminophen  650 mg  05/09/22 01:38 





  Acetaminophen 325 Mg Tab  PO  





  Q4H PRN  





  Pain MILD(1-3)/Fever >100.5/HA  


 


Albuterol  2.5 mg  05/09/22 01:38 





  Albuterol 2.5 Mg/3 Ml Nebu  IH  





  Q3HRT PRN  





  Shortness Of Breath  


 


Carvedilol  6.25 mg  05/09/22 10:00  05/11/22 09:40





  Carvedilol 6.25 Mg Tab  PO   6.25 mg





  BID NANCY   Administration


 


Clopidogrel Bisulfate  75 mg  05/10/22 10:00  05/11/22 09:39





  Clopidogrel 75 Mg Tab  PO   75 mg





  QDAY NANCY   Administration


 


Doxazosin Mesylate  4 mg  05/09/22 22:00  05/11/22 10:21





  Doxazosin 4 Mg Tab  PO   4 mg





  BID NANCY   Administration


 


Epoetin Ranjeet-epbx  20,000 unit  05/09/22 08:10 





  Epoetin Ranjeet-Epbx 20,000 Unit/1 Ml Vial  SUB-Q  





  DEONDRE PRN  





  hemodialysis  


 


Famotidine  20 mg  05/10/22 10:00  05/11/22 09:40





  Famotidine 20 Mg Tab  PO   20 mg





  DAILY NANCY   Administration


 


Hydralazine HCl  10 mg  05/09/22 18:21  05/10/22 08:17





  Hydralazine 20 Mg/1 Ml Inj  IV   10 mg





  Q4HR PRN   Administration





  SBP >/=160; DBP >/=100  


 


Hydralazine HCl  50 mg  05/10/22 12:00  05/11/22 05:28





  Hydralazine 25 Mg Tab  PO   50 mg





  Q6HR NANCY   Administration


 


Sodium Chloride  100 mls @ 999 mls/hr  05/09/22 08:07 





  Nacl 0.9%  IV  





  DEONDRE PRN  





  Hypotension  


 


Sodium Chloride  500 mls @ 0 mls/hr  05/11/22 09:00  05/11/22 09:41





  Nacl 0.9% 500 Ml  IV  05/11/22 19:00  50 mls/hr





  ONCE@0900 NANCY   Administration





  As Directed  


 


Isosorbide Mononitrate  60 mg  05/10/22 10:00  05/11/22 09:39





  Isosorbide Mononitrate Er 60 Mg Tab  PO   60 mg





  QDAY NANCY   Administration


 


Nifedipine  90 mg  05/10/22 10:00  05/11/22 09:40





  Nifedipine Xl 90 Mg Tab  PO   90 mg





  QDAY NANCY   Administration


 


Ondansetron HCl  4 mg  05/09/22 01:38 





  Ondansetron 4 Mg/2 Ml Inj  IV  





  Q8H PRN  





  Nausea And Vomiting  


 


Sodium Chloride  10 ml  05/09/22 10:00  05/11/22 09:42





  Sodium Chloride 0.9% 10 Ml Flush Syringe  IV   10 ml





  BID NANCY   Administration


 


Sodium Chloride  10 ml  05/09/22 01:38 





  Sodium Chloride 0.9% 10 Ml Flush Syringe  IV  





  PRN PRN  





  LINE FLUSH

## 2022-05-11 NOTE — HEM/ONC CONSULTATION
History of Present Illness





- Reason for Consult


Consult date: 05/11/22


Tongue bleed, dysfunction platelets





- History of Present Illness


Heme consult note


Discussed with RN and patient over phone


Televisit cart unavailable 


CPT 97013


Dx Coagulopathy








This is a 47yo male who presented to [facility] ED with complaints of a bleeding 

tongue after a bite. Known past medical history of ESRD on HD (M,W,F), DVT on 

Eliquis, Brugada syndrome, HTN, MI s/p stent placement, and COPD, O2 dependent 

at home admitted for pulmonary edema and hypertensive urgency. Hematology 

consulted for evaluation of bleeding tongue. 





Patient reports starting Eliquis 2 months ago due to a DVT. 





Data reviewed below


IMP:


Coagulopathy, likely due to anticoagulant use, liver dysfunction


S/p FFP and DDVAP due to bleeding tongue


Microcytic anemia, likely related to iron deficiency and renal insufficiency


R/o hemoglobinopathy, hemolysis


Clotting tendency, r/o antiphospholipid syndrome


Thrombocytopenia, likely related to liver dysfunction, or ITP 








PLAN:


Labs to include iron studies, LDH, retic fibrinogen, cardiolipin ab, hemoglobin 

electrophoresis


Monitor CBC


Transfuse 1 unit RBC whenever hct <22


Hold AC due to degree of anemia 


                             Laboratory Last Values











WBC  5.5 K/mm3 (4.5-11.0)   05/11/22  04:53    


 


    


 


Hgb  7.2 gm/dl (11.8-15.2)  L  05/11/22  04:53    


 


Hct  21.6 % (35.5-45.6)  L  05/11/22  04:53    


 


MCV  83 fl (84-94)  L  05/11/22  04:53    


 


  


 


  


 


    


 


Plt Count  114 K/mm3 (140-440)  L  05/11/22  04:53    


 


  


 


  


 


  


 


  


 


  


 


  


 


  


 


  


 


  


 


     


 


PT  15.1 Sec. (12.2-14.9)  H  05/09/22  23:26    


 


INR  1.07  (0.87-1.13)   05/09/22  23:26    


 


APTT  30.8 Sec. (24.2-36.6)   05/09/22  23:26    


 


  


 


  


 


  


 


  


 


  


 


  


 


  


 


  


 


  


 


  


 


  


 


  


 


  


 


  


 


  


 


  


 


  


 


  


 


  


 


  


 


   


 


Creatinine  10.8 mg/dL (0.8-1.3)  H  05/11/22  04:53    


 


  


 


  


 


  


 


  


 


  


 


  


 


  


 


     


 


AST  13 units/L (5-40)   05/09/22  00:13    


 


ALT  12 units/L (7-56)   05/09/22  00:13    


 


Alkaline Phosphatase  152 units/L ()  H  05/09/22  00:13    


 


Total Creatine Kinase  329 units/L ()  H  05/09/22  00:13    


 


  


 


  


 


  


 


  


 


  


 


  


 


  


 


  


 


  


 


  


 


   


 


Hepatitis A IgM Ab  Non-reactive  (NonReactive)   05/09/22  11:27    


 


Hep Bs Antigen  Non-reactive  (Negative)   05/09/22  11:27    


 


Hep B Core IgM Ab  Non-reactive  (NonReactive)   05/09/22  11:27    


 


Hepatitis C Antibody  Non-reactive  (NonReactive)   05/09/22  11:27    


 


Blood Type  B POSITIVE   05/11/22  Unknown














Past History


Past Medical History: ESRD, renal failure, other (Brugada Syndrome, ventral 

abdominal hernia)


Past Surgical History: No surgical history


Social history: no significant social history


Family history: hypertension





Medications and Allergies


                                    Allergies











Allergy/AdvReac Type Severity Reaction Status Date / Time


 


No Known Allergies Allergy   Unverified 01/10/14 09:48











                                Home Medications











 Medication  Instructions  Recorded  Confirmed  Last Taken  Type


 


carvediloL [Coreg] 6.25 mg PO BID #60 tablet 01/15/14 05/10/22 Unknown Rx


 


Clopidogrel [Plavix] 75 mg PO QDAY 05/09/22 05/09/22 Unknown History


 


Doxazosin [Cardura] 4 mg PO BID 05/09/22 05/09/22 Unknown History


 


Eliquis 5 mg PO BID 05/09/22 05/09/22 Unknown History


 


Isosorbide Mononitrate [Isosorbide 60 mg PO QDAY 05/09/22 05/09/22 Unknown 

History





Mononitrate ER]     


 


Nifedipine ER 90 mg PO QDAY 05/09/22 05/09/22 Unknown History


 


hydrALAZINE 100 mg PO BID 05/09/22 05/09/22 Unknown History











Active Meds: 


Active Medications





Acetaminophen (Acetaminophen 325 Mg Tab)  650 mg PO Q4H PRN


   PRN Reason: Pain MILD(1-3)/Fever >100.5/HA


Albuterol (Albuterol 2.5 Mg/3 Ml Nebu)  2.5 mg IH Q3HRT PRN


   PRN Reason: Shortness Of Breath


Carvedilol (Carvedilol 6.25 Mg Tab)  6.25 mg PO BID [facility]


   Last Admin: 05/11/22 09:40 Dose:  6.25 mg


   


Clopidogrel Bisulfate (Clopidogrel 75 Mg Tab)  75 mg PO QDAY [facility]


   Last Admin: 05/11/22 09:39 Dose:  75 mg


   


Doxazosin Mesylate (Doxazosin 4 Mg Tab)  4 mg PO BID [facility]


   Last Admin: 05/11/22 10:21 Dose:  4 mg


   


Epoetin Ranjeet-epbx (Epoetin Ranjeet-Epbx 20,000 Unit/1 Ml Vial)  20,000 unit SUB-Q 

DEONDRE PRN


   PRN Reason: hemodialysis


Famotidine (Famotidine 20 Mg Tab)  20 mg PO DAILY [facility]


   Last Admin: 05/11/22 09:40 Dose:  20 mg


   


Hydralazine HCl (Hydralazine 20 Mg/1 Ml Inj)  10 mg IV Q4HR PRN


   PRN Reason: SBP >/=160; DBP >/=100


   Last Admin: 05/10/22 08:17 Dose:  10 mg


   


Hydralazine HCl (Hydralazine 25 Mg Tab)  50 mg PO Q6HR [facility]


   Last Admin: 05/11/22 05:28 Dose:  50 mg


   


Sodium Chloride (Nacl 0.9%)  100 mls @ 999 mls/hr IV DEONDRE PRN


   PRN Reason: Hypotension


Sodium Chloride (Nacl 0.9% 500 Ml)  500 mls @ 0 mls/hr IV ONCE@0900 [facility]


   Stop: 05/11/22 19:00


   Last Admin: 05/11/22 09:41 Dose:  50 mls/hr


   


Isosorbide Mononitrate (Isosorbide Mononitrate Er 60 Mg Tab)  60 mg PO QDAY [facility]


   Last Admin: 05/11/22 09:39 Dose:  60 mg


   


Nifedipine (Nifedipine Xl 90 Mg Tab)  90 mg PO QDAY [facility]


   Last Admin: 05/11/22 09:40 Dose:  90 mg


   


Ondansetron HCl (Ondansetron 4 Mg/2 Ml Inj)  4 mg IV Q8H PRN


   PRN Reason: Nausea And Vomiting


Sodium Chloride (Sodium Chloride 0.9% 10 Ml Flush Syringe)  10 ml IV BID [facility]


   Last Admin: 05/11/22 09:42 Dose:  10 ml


   


Sodium Chloride (Sodium Chloride 0.9% 10 Ml Flush Syringe)  10 ml IV PRN PRN


   PRN Reason: LINE FLUSH











Exam





- Constitutional


Vitals: 


                                Last Vital Signs











Temp  98.7 F   05/11/22 10:50


 


Pulse  70   05/11/22 12:30


 


Resp  18   05/11/22 10:50


 


BP  169/104   05/11/22 12:30


 


Pulse Ox  98   05/11/22 10:50














Results





- Labs


lab Results: 


                         Laboratory Results - last 24 hr











  05/10/22 05/11/22 05/11/22





  11:33 04:53 04:53


 


WBC   5.5 


 


RBC   2.61 L 


 


Hgb   7.2 L 


 


Hct   21.6 L 


 


MCV   83 L 


 


MCH   28 


 


MCHC   33 


 


RDW   20.3 H 


 


Plt Count   114 L 


 


Sodium    137


 


Potassium    3.8


 


Chloride    97.3 L


 


Carbon Dioxide    23


 


Anion Gap    21


 


BUN    64 H


 


Creatinine    10.8 H


 


Estimated GFR    6


 


BUN/Creatinine Ratio    6


 


Glucose    73 L


 


POC Glucose  74  


 


Calcium    8.4


 


Phosphorus    4.50


 


Magnesium    2.30


 


Blood Type   














  05/11/22





  Unknown


 


WBC 


 


RBC 


 


Hgb 


 


Hct 


 


MCV 


 


MCH 


 


MCHC 


 


RDW 


 


Plt Count 


 


Sodium 


 


Potassium 


 


Chloride 


 


Carbon Dioxide 


 


Anion Gap 


 


BUN 


 


Creatinine 


 


Estimated GFR 


 


BUN/Creatinine Ratio 


 


Glucose 


 


POC Glucose 


 


Calcium 


 


Phosphorus 


 


Magnesium 


 


Blood Type  B POSITIVE

## 2022-05-11 NOTE — PROGRESS NOTE
Assessment and Plan


Assessment and plan: 


This is a 48-year-old male with known past medical history of ESRD on HD(M,W,F),

DVT on Eliquis, Brugada syndrome, HTN, MI s/p stent placement, and COPD, O2 

dependent at home admitted for pulmonary edema and hypertensive urgency.





Hospital Course to Date:


5/9: Patient seen and examined at the bedside. Patient is fully AAO, on HHFL at 

40% and 20L SPO2 above 92%. Patient still with dyspnea with exertion, plan for 

HD today per Nephrology. Oral bleeding resolved, H&H remains strable, will 

resume home AC- Eliquis. Left upper chest Permacath and LUE Av-fistula noted, 

per patient plan was to remove permacath two weeks ago however appointment was 

rescheduled due to unforeseen events. D/w Nephro plan for possible permacath 

removal sometimes this week. Patient remains on Nitro gtt, still hypertensive 

this am. Will resume patient's home antihypertensive regimen. Titrate Nitro gtt 

for SBP less than 160. [facility] is also following. Diagnosis and plan of care 

discussed with patient at the bedside. Patient verbalized understanding and 

agreed with current plan of care. All questions and concerns addressed at this 

time. Team will continue to f/u with any further updates.





5/10: Tolerated HD yesterday, 4.5L removed. Now stable on 3L NC SPO2 at 100%. 

Patient is off Nitro gtt this am, SBP in he 170s, home antihypertensive resumed.

Patient reported that he had a heart attack in the past and a stent was placed 

at that time but he does not see a cardiologist. 2D echo pending. D/W [facility] 

patient is stable for transfer to the floor.








5/11: Patient seen and examined this morning still with bleeding his tongue.  

This is a clear demarcated area of laceration but doubt that this is could have 

been caused by bite.  Hemoglobin is 7.2 I have ordered DDAVP and consultation 

with the nephrologist.  Also ordered FFP and consultation with a hematologist 

that I consulted.  We will hold his Eliquis at this time.  Also on Eliquis he 

probably will benefit from the lower dose being that he has renal impediment.  

He may need transfusion we will monitor H&H in a.m..  I have provided extensive 

counseling to the patient about his underlying clinical condition.  We will 

check a vitamin D level and further management as tolerated


I will try to find out when this patient experienced his DVT and if he truly 

should still remain on Eliquis so this can be stopped safely.





Assessment and Plan








#Acute Hypoxemic Respiratory Failure 2/2


#Pulmonary Edema


#COPD, O2 Dependent


- Presented with SOB, dyspnea required HHFL


- CXR suggesting pulmonary edema


- HD yesterday, 4.5L removed


- Patient down to 3L NC this am SPO2 at 100%


- CCM consulted, appreciate recommendations


- Continue bronchodilators per CCM


- Continue O2 supplementation, wean to home O2 at 2L as tolerated


- Continue HD per Nephro


- Continue SPO2 monitoring for SPO2 goal above 92%





#Hypertensive Urgency


#Cardiomegaly


#Possible CHF


#H/o Brugada syndrome and MI with Stent placement


- Presented with SBP in the 190s requiring Nitroglcerin gtt


- Nitro gtt off this am, SBP still in the 170


- Home antihypertensives and antiplatelets resumed


- 2D echo pending


- Continue blood pressure monitor per protocol


- PRN hydralazine for SBP greater than 160





#End Stage Renal Disease(ESRD) on HD


- regular days M,W,F


- Per patient last HD day was on Friday prior to admit


- Nephrology on consult, appreciated recommendation


- HD yesterday, 4.5L removed


- Continue HD per Nephro


- Strict intake and output


- Avoid nephrotoxic medications; Renally dose medications 


- Monitor and replace electrolytes as needed





#Anemia of Chronic Disease


#Hemorrhage of Tongue-resolved


- Presented with heavy oral bleeding after bitting his tongue


- s/p DDAVP in the ED


- Bleeding resolved, H&H remains stable


- Continue to monitor for s/s of any bleeding


- Trend H&H


- Transfuse for hemoglobin less than 7


- Continue Epogen per Nephro





#H/o DVT


-Hold Eliquis, SCDs for now





#GI/DVT Prophylaxis


- PPI- Pepcid


-Hold, SCDs Eliquis











History


Interval history: 


Patient seen and examined still with bleeding noted.








Hospitalist Physical





- Physical exam


Narrative exam: 


General appearance: Present: no acute distress, well-nourished





- EENT


Eyes: Present: PERRL, tongue laceration with obvious bleeding noted.


ENT: hearing intact





- Neck


Neck: Present: normal ROM





- Respiratory


Respiratory effort: normal


Respiratory: bilateral: diminished





- Cardiovascular


Rhythm: regular


Heart Sounds: Present: S1 & S2





- Extremities


Extremities: no ischemia, pulses intact, pulses symmetrical


Peripheral Pulses: within normal limits





- Abdominal


General gastrointestinal: soft, non-distended, normal bowel sounds





- Integumentary


Integumentary: Present: clear, warm, dry





- Psychiatric


Psychiatric: appropriate mood/affect, cooperative





- Neurologic


Neurologic: CNII-XII intact, moves all extremities





- Allied Health


Allied health notes reviewed: nursing











- Constitutional


Vitals: 


                                        











Temp Pulse Resp BP Pulse Ox


 


 98.3 F   72   20   162/91   100 


 


 05/11/22 04:27  05/11/22 10:21  05/11/22 04:27  05/11/22 10:21  05/11/22 04:27











General appearance: Present: no acute distress, well-nourished





HEART Score





- HEART Score


Troponin: 


                                        











Troponin T  0.075 ng/mL (0.00-0.029)  H  05/09/22  00:13    














Results





- Labs


CBC & Chem 7: 


                                 05/11/22 04:53





                                 05/11/22 04:53


Labs: 


                             Laboratory Last Values











WBC  5.5 K/mm3 (4.5-11.0)   05/11/22  04:53    


 


RBC  2.61 M/mm3 (3.65-5.03)  L  05/11/22  04:53    


 


Hgb  7.2 gm/dl (11.8-15.2)  L  05/11/22  04:53    


 


Hct  21.6 % (35.5-45.6)  L  05/11/22  04:53    


 


MCV  83 fl (84-94)  L  05/11/22  04:53    


 


MCH  28 pg (28-32)   05/11/22  04:53    


 


MCHC  33 % (32-34)   05/11/22  04:53    


 


RDW  20.3 % (13.2-15.2)  H  05/11/22  04:53    


 


Plt Count  114 K/mm3 (140-440)  L  05/11/22  04:53    


 


Lymph % (Auto)  15.2 % (13.4-35.0)   05/10/22  04:47    


 


Mono % (Auto)  8.3 % (0.0-7.3)  H  05/10/22  04:47    


 


Eos % (Auto)  5.9 % (0.0-4.3)  H  05/10/22  04:47    


 


Baso % (Auto)  0.9 % (0.0-1.8)   05/10/22  04:47    


 


Lymph # (Auto)  0.7 K/mm3 (1.2-5.4)  L  05/10/22  04:47    


 


Mono # (Auto)  0.3 K/mm3 (0.0-0.8)   05/10/22  04:47    


 


Eos # (Auto)  0.2 K/mm3 (0.0-0.4)   05/10/22  04:47    


 


Baso # (Auto)  0.1 K/mm3 (0.0-0.1)   05/10/22  04:47    


 


Seg Neutrophils %  68.4 % (40.0-70.0)   05/10/22  04:47    


 


Seg Neutrophils #  2.9 K/mm3 (1.8-7.7)   05/10/22  04:47    


 


PT  15.1 Sec. (12.2-14.9)  H  05/09/22  23:26    


 


INR  1.07  (0.87-1.13)   05/09/22  23:26    


 


APTT  30.8 Sec. (24.2-36.6)   05/09/22  23:26    


 


ABG pH  7.448  (7.320-7.450)   05/09/22  01:30    


 


POC ABG pCO2  31.9 mmHg (32.0-48.0)  L  05/09/22  01:30    


 


POC ABG pO2  53.3 mmHg ()  L  05/09/22  01:30    


 


POC ABG HCO3  21.6   05/09/22  01:30    


 


ABG O2 Saturation  88.6  (0-100)   05/09/22  01:30    


 


POC ABG Base Excess  -1.8   05/09/22  01:30    


 


ABG Hemoglobin  10.9  (12.0-17.5)  L  05/09/22  01:30    


 


ABG Oxyhemoglobin  87.9  (94-98)  L  05/09/22  01:30    


 


ABG Methemoglobin  0.3  (0.0-1.5)   05/09/22  01:30    


 


ABG Sodium  Not Reportable   05/09/22  01:30    


 


ABG Potassium  Not Reportable   05/09/22  01:30    


 


ABG Chloride  Not Reportable   05/09/22  01:30    


 


ABG Glucose  Not Reportable   05/09/22  01:30    


 


Carboxyhemoglobin  0.5  (0.5-1.5)   05/09/22  01:30    


 


FiO2 %  21.0   05/09/22  01:30    


 


Sodium  137 mmol/L (137-145)   05/11/22  04:53    


 


Potassium  3.8 mmol/L (3.6-5.0)   05/11/22  04:53    


 


Chloride  97.3 mmol/L ()  L  05/11/22  04:53    


 


Carbon Dioxide  23 mmol/L (22-30)   05/11/22  04:53    


 


Anion Gap  21 mmol/L  05/11/22  04:53    


 


BUN  64 mg/dL (9-20)  H  05/11/22  04:53    


 


Creatinine  10.8 mg/dL (0.8-1.3)  H  05/11/22  04:53    


 


Estimated GFR  6 ml/min  05/11/22  04:53    


 


BUN/Creatinine Ratio  6 %  05/11/22  04:53    


 


Glucose  73 mg/dL ()  L  05/11/22  04:53    


 


POC Glucose  74 mg/dL ()   05/10/22  11:33    


 


Calcium  8.4 mg/dL (8.4-10.2)   05/11/22  04:53    


 


Phosphorus  4.50 mg/dL (2.5-4.5)   05/11/22  04:53    


 


Magnesium  2.30 mg/dL (1.7-2.3)   05/11/22  04:53    


 


Total Bilirubin  0.60 mg/dL (0.1-1.2)   05/09/22  00:13    


 


AST  13 units/L (5-40)   05/09/22  00:13    


 


ALT  12 units/L (7-56)   05/09/22  00:13    


 


Alkaline Phosphatase  152 units/L ()  H  05/09/22  00:13    


 


Total Creatine Kinase  329 units/L ()  H  05/09/22  00:13    


 


Troponin T  0.075 ng/mL (0.00-0.029)  H  05/09/22  00:13    


 


NT-Pro-B Natriuret Pep  04281 pg/mL (0-450)  H  05/09/22  00:13    


 


Total Protein  6.9 g/dL (6.3-8.2)   05/09/22  00:13    


 


Albumin  4.1 g/dL (3.9-5)   05/09/22  00:13    


 


Albumin/Globulin Ratio  1.5 %  05/09/22  00:13    


 


Triglycerides  46 mg/dL (2-149)   05/09/22  00:13    


 


Cholesterol  140 mg/dL ()   05/09/22  00:13    


 


LDL Cholesterol Direct  40 mg/dL ()  L  05/09/22  00:13    


 


HDL Cholesterol  88 mg/dL (40-59)  H  05/09/22  00:13    


 


Cholesterol/HDL Ratio  1.59 %  05/09/22  00:13    


 


Arterial Blood Glucose  Not Reportable   05/09/22  01:30    


 


Hepatitis A IgM Ab  Non-reactive  (NonReactive)   05/09/22  11:27    


 


Hep Bs Antigen  Non-reactive  (Negative)   05/09/22  11:27    


 


Hep B Core IgM Ab  Non-reactive  (NonReactive)   05/09/22  11:27    


 


Hepatitis C Antibody  Non-reactive  (NonReactive)   05/09/22  11:27    











Steel/IV: 


                                        





Voiding Method                   Urinal











Active Medications





- Current Medications


Current Medications: 














Generic Name Dose Route Start Last Admin





  Trade Name Freq  PRN Reason Stop Dose Admin


 


Acetaminophen  650 mg  05/09/22 01:38 





  Acetaminophen 325 Mg Tab  PO  





  Q4H PRN  





  Pain MILD(1-3)/Fever >100.5/HA  


 


Albuterol  2.5 mg  05/09/22 01:38 





  Albuterol 2.5 Mg/3 Ml Nebu  IH  





  Q3HRT PRN  





  Shortness Of Breath  


 


Carvedilol  6.25 mg  05/09/22 10:00  05/11/22 09:40





  Carvedilol 6.25 Mg Tab  PO   6.25 mg





  BID NANCY   Administration


 


Clopidogrel Bisulfate  75 mg  05/10/22 10:00  05/11/22 09:39





  Clopidogrel 75 Mg Tab  PO   75 mg





  QDAY NANCY   Administration


 


Doxazosin Mesylate  4 mg  05/09/22 22:00  05/11/22 10:21





  Doxazosin 4 Mg Tab  PO   4 mg





  BID NANCY   Administration


 


Epoetin Ranjeet-epbx  20,000 unit  05/09/22 08:10 





  Epoetin Ranjeet-Epbx 20,000 Unit/1 Ml Vial  SUB-Q  





  DEONDRE PRN  





  hemodialysis  


 


Famotidine  20 mg  05/10/22 10:00  05/11/22 09:40





  Famotidine 20 Mg Tab  PO   20 mg





  DAILY NANCY   Administration


 


Hydralazine HCl  10 mg  05/09/22 18:21  05/10/22 08:17





  Hydralazine 20 Mg/1 Ml Inj  IV   10 mg





  Q4HR PRN   Administration





  SBP >/=160; DBP >/=100  


 


Hydralazine HCl  50 mg  05/10/22 12:00  05/11/22 05:28





  Hydralazine 25 Mg Tab  PO   50 mg





  Q6HR NANCY   Administration


 


Sodium Chloride  100 mls @ 999 mls/hr  05/09/22 08:07 





  Nacl 0.9%  IV  





  DEONDRE PRN  





  Hypotension  


 


Sodium Chloride  500 mls @ 0 mls/hr  05/11/22 09:00  05/11/22 09:41





  Nacl 0.9% 500 Ml  IV  05/11/22 19:00  50 mls/hr





  ONCE@0900 NANCY   Administration





  As Directed  


 


Isosorbide Mononitrate  60 mg  05/10/22 10:00  05/11/22 09:39





  Isosorbide Mononitrate Er 60 Mg Tab  PO   60 mg





  QDAY NANCY   Administration


 


Nifedipine  90 mg  05/10/22 10:00  05/11/22 09:40





  Nifedipine Xl 90 Mg Tab  PO   90 mg





  QDAY NANCY   Administration


 


Ondansetron HCl  4 mg  05/09/22 01:38 





  Ondansetron 4 Mg/2 Ml Inj  IV  





  Q8H PRN  





  Nausea And Vomiting  


 


Sodium Chloride  10 ml  05/09/22 10:00  05/11/22 09:42





  Sodium Chloride 0.9% 10 Ml Flush Syringe  IV   10 ml





  BID ANNCY   Administration


 


Sodium Chloride  10 ml  05/09/22 01:38 





  Sodium Chloride 0.9% 10 Ml Flush Syringe  IV  





  PRN PRN  





  LINE FLUSH

## 2022-05-12 NOTE — OPERATIVE REPORT
Operative Report


Operative Report: 





Exam: PermCath removal 





Clinical indication: Patient with a history of end-stage renal disease who is 

dialyzed over the last month or a left upper arm AV access.  Patient has an 

indwelling left chest wall tunneled hemodialysis catheter.  Patient has 

prominent chest wall veins.





Date: 05/12/2022





Procedure: Following an explanation of the risk, benefits and alternatives; 

written informed consent was obtained.  The patient was brought to the 

angiographic suite and placed in supine position on the examination table.  The 

patient did require a wedge secondary to baseline shortness of breath that is 

been present since prior to his admission.  Initial fluoroscopic views of the 

indwelling tunneled hemodialysis catheter were obtained which demonstrate that 

the tip appears to terminate in the SVC.  There appears to be kinking at the tip

of the catheter from prior placement.





The patient's left chest wall and indwelling catheter were prepped and draped in

usual sterile fashion.  A 0.035 Glidewire was then advanced through the venous 

port into the atrium.  The catheter was then withdrawn proximally however, there

appears to be some resistance centrally.  With the catheter withdrawn, contrast 

was injected which demonstrates that the catheter does not appear to be adherent

to the SVC.  Would suspect that there is extensive scar tissue at the catheter 

entrance into the internal jugular vein.  With a Glidewire and the right atrium,

the catheter was manipulated free.  The catheter was then withdrawn proximally 

into the internal jugular vein.  Contrast was injected which demonstrates that 

the central veins are widely patent and no venous injury or extravasation is 

identified.  The catheter was then removed over the guidewire and the guidewire 

removed.  The catheter was inspected and is intact.  The previously noted 

kinking under fluoroscopy has resolved as a result of wire bias.





Hemostasis was achieved using mini compression and the catheter exit site was 

sterilely dressed.





The patient tolerated the procedure well.  There were no immediate postprocedure

complications.





No sedation was utilized.  Continuous cardiopulmonary monitoring is utilized.





Impression: 1) Patient with previously attempted removal of PermCath at bedside 

terminated secondary to resistance in the central aspect of the catheter after 

the cuff was dissected free and withdrawn. 2) Fluoroscopic guided evaluation of 

the central veins both pre and post catheter removal demonstrating patency at 

both time intervals with no evidence of venous injury.  3) Fluoroscopic imaging 

demonstrating kinking of the catheter at the tip.  This may have resulted in 

some fibrous buildup resulting in some difficulty in removal of the catheter 

versus extensive scar formation at the catheter extrance site into the vein.  

Imaging obtained following removal demonstrated no venous injury.

## 2022-05-12 NOTE — HEM/ONC PROGRESS NOTE
Subjective


Date of service: 05/12/22


Interval history: 





Heme Data Review





47yo male with complaints of a bleeding tongue after a bite


Known past medical history of ESRD on HD (M,W,F), DVT on Eliquis, Brugada 

syndrome, HTN, MI s/p stent placement, and COPD, O2 dependent at home admitted 

for pulmonary edema and hypertensive urgency


Hematology following for bleeding tongue,and Eliquis management.





Patient reports starting Eliquis 2 months ago due to a DVT. 


Discharge plan today





Data reviewed below


IMP:


Coagulopathy, likely due to anticoagulant use, liver dysfunction


S/p FFP and DDVAP due to bleeding tongue


Microcytic anemia, likely related to iron deficiency, hemolysis, renal 

insufficiency


R/o hemoglobinopathy


Clotting tendency, r/o antiphospholipid syndrome


Thrombocytopenia, likely related to liver dysfunction, or ITP 








PLAN:


Ok to discontinue Eliquis due to degree of anemia


Outpatient Hematology follow up with Dr. Ozzie Garcia








                             Laboratory Last Values











WBC  4.2 K/mm3 (4.5-11.0)  L  05/12/22  04:24    


 


RBC  2.63 M/mm3 (3.65-5.03)  L  05/12/22  04:24    


 


Hgb  7.1 gm/dl (11.8-15.2)  L  05/12/22  04:24    


 


Hct  21.7 % (35.5-45.6)  L  05/12/22  04:24    


 


MCV  83 fl (84-94)  L  05/12/22  04:24    


 


MCH  27 pg (28-32)  L  05/12/22  04:24    


 


MCHC  33 % (32-34)   05/12/22  04:24    


 


RDW  20.5 % (13.2-15.2)  H  05/12/22  04:24    


 


Plt Count  133 K/mm3 (140-440)  L  05/12/22  04:24    


 


Lymph % (Auto)  15.2 % (13.4-35.0)   05/10/22  04:47    


 


Mono % (Auto)  8.3 % (0.0-7.3)  H  05/10/22  04:47    


 


Eos % (Auto)  5.9 % (0.0-4.3)  H  05/10/22  04:47    


 


Baso % (Auto)  0.9 % (0.0-1.8)   05/10/22  04:47    


 


Lymph # (Auto)  0.7 K/mm3 (1.2-5.4)  L  05/10/22  04:47    


 


Mono # (Auto)  0.3 K/mm3 (0.0-0.8)   05/10/22  04:47    


 


Eos # (Auto)  0.2 K/mm3 (0.0-0.4)   05/10/22  04:47    


 


Baso # (Auto)  0.1 K/mm3 (0.0-0.1)   05/10/22  04:47    


 


Seg Neutrophils %  68.4 % (40.0-70.0)   05/10/22  04:47    


 


Seg Neutrophils #  2.9 K/mm3 (1.8-7.7)   05/10/22  04:47    


 


Percent Retic  3.04 % (0.78-2.58)  H  05/12/22  04:24    


 


PT  15.1 Sec. (12.2-14.9)  H  05/09/22  23:26    


 


INR  1.07  (0.87-1.13)   05/09/22  23:26    


 


APTT  30.8 Sec. (24.2-36.6)   05/09/22  23:26    


 


Fibrinogen  381 mg/dl (211-480)   05/12/22  04:24    


 


ABG pH  7.448  (7.320-7.450)   05/09/22  01:30    


 


POC ABG pCO2  31.9 mmHg (32.0-48.0)  L  05/09/22  01:30    


 


POC ABG pO2  53.3 mmHg ()  L  05/09/22  01:30    


 


POC ABG HCO3  21.6   05/09/22  01:30    


 


ABG O2 Saturation  88.6  (0-100)   05/09/22  01:30    


 


POC ABG Base Excess  -1.8   05/09/22  01:30    


 


ABG Hemoglobin  10.9  (12.0-17.5)  L  05/09/22  01:30    


 


ABG Oxyhemoglobin  87.9  (94-98)  L  05/09/22  01:30    


 


ABG Methemoglobin  0.3  (0.0-1.5)   05/09/22  01:30    


 


ABG Sodium  Not Reportable   05/09/22  01:30    


 


ABG Potassium  Not Reportable   05/09/22  01:30    


 


ABG Chloride  Not Reportable   05/09/22  01:30    


 


ABG Glucose  Not Reportable   05/09/22  01:30    


 


Carboxyhemoglobin  0.5  (0.5-1.5)   05/09/22  01:30    


 


FiO2 %  21.0   05/09/22  01:30    


 


Sodium  135 mmol/L (137-145)  L  05/12/22  04:24    


 


Potassium  3.6 mmol/L (3.6-5.0)   05/12/22  04:24    


 


Chloride  95.9 mmol/L ()  L  05/12/22  04:24    


 


Carbon Dioxide  25 mmol/L (22-30)   05/12/22  04:24    


 


Anion Gap  18 mmol/L  05/12/22  04:24    


 


BUN  47 mg/dL (9-20)  H  05/12/22  04:24    


 


Creatinine  8.1 mg/dL (0.8-1.3)  H  05/12/22  04:24    


 


Creatinine  8.4 mg/dL (0.8-1.3)  H  05/12/22  04:24    


 


Estimated GFR  8 ml/min  05/12/22  04:24    


 


Estimated GFR  9 ml/min  05/12/22  04:24    


 


BUN/Creatinine Ratio  6 %  05/12/22  04:24    


 


Glucose  88 mg/dL ()   05/12/22  04:24    


 


POC Glucose  74 mg/dL ()   05/10/22  11:33    


 


Calcium  8.4 mg/dL (8.4-10.2)   05/12/22  04:24    


 


Phosphorus  4.50 mg/dL (2.5-4.5)   05/11/22  04:53    


 


Magnesium  2.30 mg/dL (1.7-2.3)   05/11/22  04:53    


 


Iron  43 ug/dL ()  L  05/12/22  04:24    


 


TIBC  235 mcg/dL (250-450)  L  05/12/22  04:24    


 


Ferritin  1232.0 ng/mL (30.0-300.0)  H  05/12/22  04:24    


 


Total Bilirubin  0.60 mg/dL (0.1-1.2)   05/09/22  00:13    


 


AST  13 units/L (5-40)   05/09/22  00:13    


 


ALT  12 units/L (7-56)   05/09/22  00:13    


 


Alkaline Phosphatase  152 units/L ()  H  05/09/22  00:13    


 


Lactate Dehydrogenase  191 units/L ()  H  05/12/22  04:24    


 


Total Creatine Kinase  329 units/L ()  H  05/09/22  00:13    


 


Troponin T  0.075 ng/mL (0.00-0.029)  H  05/09/22  00:13    


 


NT-Pro-B Natriuret Pep  33205 pg/mL (0-450)  H  05/09/22  00:13    


 


Total Protein  6.9 g/dL (6.3-8.2)   05/09/22  00:13    


 


Albumin  4.1 g/dL (3.9-5)   05/09/22  00:13    


 


Albumin/Globulin Ratio  1.5 %  05/09/22  00:13    


 


Triglycerides  46 mg/dL (2-149)   05/09/22  00:13    


 


Cholesterol  140 mg/dL ()   05/09/22  00:13    


 


LDL Cholesterol Direct  40 mg/dL ()  L  05/09/22  00:13    


 


HDL Cholesterol  88 mg/dL (40-59)  H  05/09/22  00:13    


 


Cholesterol/HDL Ratio  1.59 %  05/09/22  00:13    


 


Arterial Blood Glucose  Not Reportable   05/09/22  01:30    


 


Hepatitis A IgM Ab  Non-reactive  (NonReactive)   05/09/22  11:27    


 


Hep Bs Antigen  Non-reactive  (Negative)   05/09/22  11:27    


 


Hep B Core IgM Ab  Non-reactive  (NonReactive)   05/09/22  11:27    


 


Hepatitis C Antibody  Non-reactive  (NonReactive)   05/09/22  11:27    


 


Blood Type  B POSITIVE   05/11/22  Unknown














Objective





- Constitutional


Vitals: 


                                Last Vital Signs











Temp  98.7 F   05/12/22 04:55


 


Pulse  80   05/12/22 09:49


 


Resp  18   05/12/22 04:55


 


BP  153/86   05/12/22 09:49


 


Pulse Ox  98   05/12/22 04:55














- Labs


Lab Results: 


                         Laboratory Results - last 24 hr











  05/11/22 05/12/22 05/12/22





  Unknown 04:24 04:24


 


WBC    4.2 L


 


RBC    2.63 L


 


Hgb    7.1 L


 


Hct    21.7 L


 


MCV    83 L


 


MCH    27 L


 


MCHC    33


 


RDW    20.5 H


 


Plt Count    133 L


 


Percent Retic    3.04 H


 


Fibrinogen   


 


Sodium   


 


Potassium   


 


Chloride   


 


Carbon Dioxide   


 


Anion Gap   


 


BUN   


 


Creatinine   8.4 H 


 


Estimated GFR   8 


 


BUN/Creatinine Ratio   


 


Glucose   


 


Calcium   


 


Iron   


 


TIBC   


 


Ferritin   


 


Lactate Dehydrogenase   


 


Blood Type  B POSITIVE  














  05/12/22 05/12/22 05/12/22





  04:24 04:24 04:24


 


WBC   


 


RBC   


 


Hgb   


 


Hct   


 


MCV   


 


MCH   


 


MCHC   


 


RDW   


 


Plt Count   


 


Percent Retic   


 


Fibrinogen   381 


 


Sodium  135 L  


 


Potassium  3.6  


 


Chloride  95.9 L  


 


Carbon Dioxide  25  


 


Anion Gap  18  


 


BUN  47 H  


 


Creatinine  8.1 H  


 


Estimated GFR  9  


 


BUN/Creatinine Ratio  6  


 


Glucose  88  


 


Calcium  8.4  


 


Iron  43 L  


 


TIBC  235 L  


 


Ferritin    1232.0 H


 


Lactate Dehydrogenase  191 H  


 


Blood Type   














Medications & Allergies





- Medications


Allergies/Adverse Reactions: 


                                    Allergies





No Known Allergies Allergy (Unverified 01/10/14 09:48)


   








Home Medications: 


                                Home Medications











 Medication  Instructions  Recorded  Confirmed  Last Taken  Type


 


carvediloL [Coreg] 6.25 mg PO BID #60 tablet 01/15/14 05/10/22 Unknown Rx


 


Clopidogrel [Plavix] 75 mg PO QDAY 05/09/22 05/09/22 Unknown History


 


Doxazosin [Cardura] 4 mg PO BID 05/09/22 05/09/22 Unknown History


 


Isosorbide Mononitrate [Isosorbide 60 mg PO QDAY 05/09/22 05/09/22 Unknown Hist

ory





Mononitrate ER]     


 


Famotidine [Pepcid] 20 mg PO DAILY #30 tablet 05/12/22  Unknown Rx


 


Hydralazine HCl 50 mg PO TID #90 tab 05/12/22  Unknown Rx


 


NIFEdipine XL [Procardia Xl] 90 mg PO QDAY #60 tablet 05/12/22  Unknown Rx











Active Medications: 














Generic Name Dose Route Start Last Admin





  Trade Name Freq  PRN Reason Stop Dose Admin


 


Acetaminophen  650 mg  05/09/22 01:38 





  Acetaminophen 325 Mg Tab  PO  





  Q4H PRN  





  Pain MILD(1-3)/Fever >100.5/HA  


 


Albuterol  2.5 mg  05/09/22 01:38 





  Albuterol 2.5 Mg/3 Ml Nebu  IH  





  Q3HRT PRN  





  Shortness Of Breath  


 


Carvedilol  6.25 mg  05/09/22 10:00  05/12/22 09:48





  Carvedilol 6.25 Mg Tab  PO   6.25 mg





  BID NANCY   Administration


 


Clopidogrel Bisulfate  75 mg  05/10/22 10:00  05/12/22 09:48





  Clopidogrel 75 Mg Tab  PO   75 mg





  QDAY NANCY   Administration


 


Doxazosin Mesylate  4 mg  05/09/22 22:00  05/12/22 09:49





  Doxazosin 4 Mg Tab  PO   4 mg





  BID NANCY   Administration


 


Epoetin Ranjeet-epbx  20,000 unit  05/09/22 08:10 





  Epoetin Ranjeet-Epbx 20,000 Unit/1 Ml Vial  SUB-Q  





  DEONDRE PRN  





  hemodialysis  


 


Famotidine  20 mg  05/10/22 10:00  05/12/22 09:48





  Famotidine 20 Mg Tab  PO   20 mg





  DAILY NANCY   Administration


 


Hydralazine HCl  10 mg  05/09/22 18:21  05/10/22 08:17





  Hydralazine 20 Mg/1 Ml Inj  IV   10 mg





  Q4HR PRN   Administration





  SBP >/=160; DBP >/=100  


 


Hydralazine HCl  50 mg  05/10/22 12:00  05/11/22 17:10





  Hydralazine 25 Mg Tab  PO   50 mg





  Q6HR NANCY   Administration


 


Sodium Chloride  100 mls @ 999 mls/hr  05/09/22 08:07 





  Nacl 0.9%  IV  





  DEONDRE PRN  





  Hypotension  


 


Isosorbide Mononitrate  60 mg  05/10/22 10:00  05/12/22 09:49





  Isosorbide Mononitrate Er 60 Mg Tab  PO   60 mg





  QDAY NANCY   Administration


 


Nifedipine  90 mg  05/10/22 10:00  05/12/22 09:48





  Nifedipine Xl 90 Mg Tab  PO   90 mg





  QDAY NANCY   Administration


 


Ondansetron HCl  4 mg  05/09/22 01:38 





  Ondansetron 4 Mg/2 Ml Inj  IV  





  Q8H PRN  





  Nausea And Vomiting  


 


Oxymetazoline HCl  2 spray  05/12/22 11:00 





  Oxymetazoline 0.05% Nasal Spray  NS  05/12/22 11:01 





  ONCE ONE  


 


Sodium Chloride  10 ml  05/09/22 10:00  05/12/22 09:53





  Sodium Chloride 0.9% 10 Ml Flush Syringe  IV   10 ml





  BID NANCY   Administration


 


Sodium Chloride  10 ml  05/09/22 01:38 





  Sodium Chloride 0.9% 10 Ml Flush Syringe  IV  





  PRN PRN  





  LINE FLUSH

## 2022-05-12 NOTE — PROGRESS NOTE
Assessment and Plan


1. ESRD:


Patient is on maintenance hemodialysis, MWF schedule.


Last outpatient HD 5/6.


Hemodialysis: 5/9, 5/11.


 


2. FEN:


Volume overload, UF with HD, monitor.


Counseled to limit fluid intake.


Monitor lytes and volume status.





3. Acute hypoxic respiratory failure, POA:


2/2 Acute pulmonary edema in the setting of volume overload.


Echo with normal EF.


Volume control thru HD.


On RA now.


Supplemental O2 as needed.





4. Hx of DVT.





5. Hypertension:


Continue home meds.


Volume control thru HD.


Follow BP.





6. Anemia, POA:


Chronic.


Epogen with HD.


Monitor.





7. Hemorrhage from tongue, POA:


DDAVP today.


Avoid Heparin.


Monitor.





8. Brugada syndrome.





9. Requested Vascular for removal of IJ dialysis catheter.


Able to use AVF without any difficulty.











Subjective:


Patient was seen and examined at the bedside.


Doing better.











Examination:


General appearance: well-developed, appears stated age, thin built, not in 

distress, on RA


HEENT: ADDY


Neck: trachea midline


Respiratory: ctab


Heart: S1S2, no murmur


Abdomen: soft, bowel sounds heard, NT, no palpable mass


Integumentary: no obvious rash


Neurologic: AO, non-focal


Ext: no edema


Hemodialysis access: L arm AVF, L IJ tunnel catheter








Subjective


Date of service: 05/12/22





Objective





- Vital Signs


Vital signs: 


                               Vital Signs - 12hr











  05/11/22 05/11/22 05/11/22





  19:04 20:23 21:05


 


Temperature 98.4 F 98.2 F 


 


Pulse Rate 82 63 


 


Respiratory 16 16 





Rate   


 


Blood Pressure 159/87 158/86 


 


O2 Sat by Pulse 100 98 98





Oximetry   














  05/11/22 05/11/22 05/12/22





  22:00 23:47 04:55


 


Temperature  99.1 F 98.7 F


 


Pulse Rate  83 80


 


Respiratory 18 18 18





Rate   


 


Blood Pressure  149/84 151/88


 


O2 Sat by Pulse 97 97 98





Oximetry   














- Lab





                                 05/12/22 04:24





                                 05/12/22 04:24


                             Most recent lab results











ABG pH  7.448  (7.320-7.450)   05/09/22  01:30    


 


ABG O2 Saturation  88.6  (0-100)   05/09/22  01:30    


 


Calcium  8.4 mg/dL (8.4-10.2)   05/12/22  04:24    


 


Phosphorus  4.50 mg/dL (2.5-4.5)   05/11/22  04:53    


 


Magnesium  2.30 mg/dL (1.7-2.3)   05/11/22  04:53    














Medications & Allergies





- Medications


Allergies/Adverse Reactions: 


                                    Allergies





No Known Allergies Allergy (Unverified 01/10/14 09:48)


   








Home Medications: 


                                Home Medications











 Medication  Instructions  Recorded  Confirmed  Last Taken  Type


 


carvediloL [Coreg] 6.25 mg PO BID #60 tablet 01/15/14 05/10/22 Unknown Rx


 


Clopidogrel [Plavix] 75 mg PO QDAY 05/09/22 05/09/22 Unknown History


 


Doxazosin [Cardura] 4 mg PO BID 05/09/22 05/09/22 Unknown History


 


Isosorbide Mononitrate [Isosorbide 60 mg PO QDAY 05/09/22 05/09/22 Unknown 

History





Mononitrate ER]     


 


Famotidine [Pepcid] 20 mg PO DAILY #30 tablet 05/12/22  Unknown Rx


 


Hydralazine HCl 50 mg PO TID #90 tab 05/12/22  Unknown Rx


 


NIFEdipine XL [Procardia Xl] 90 mg PO QDAY #60 tablet 05/12/22  Unknown Rx











Active Medications: 














Generic Name Dose Route Start Last Admin





  Trade Name Freq  PRN Reason Stop Dose Admin


 


Acetaminophen  650 mg  05/09/22 01:38 





  Acetaminophen 325 Mg Tab  PO  





  Q4H PRN  





  Pain MILD(1-3)/Fever >100.5/HA  


 


Albuterol  2.5 mg  05/09/22 01:38 





  Albuterol 2.5 Mg/3 Ml Nebu  IH  





  Q3HRT PRN  





  Shortness Of Breath  


 


Carvedilol  6.25 mg  05/09/22 10:00  05/11/22 22:42





  Carvedilol 6.25 Mg Tab  PO   6.25 mg





  BID NANCY   Administration


 


Clopidogrel Bisulfate  75 mg  05/10/22 10:00  05/11/22 09:39





  Clopidogrel 75 Mg Tab  PO   75 mg





  QDAY NANCY   Administration


 


Doxazosin Mesylate  4 mg  05/09/22 22:00  05/11/22 22:42





  Doxazosin 4 Mg Tab  PO   4 mg





  BID NANCY   Administration


 


Epoetin Ranjeet-epbx  20,000 unit  05/09/22 08:10 





  Epoetin Ranjeet-Epbx 20,000 Unit/1 Ml Vial  SUB-Q  





  DEONDRE PRN  





  hemodialysis  


 


Famotidine  20 mg  05/10/22 10:00  05/11/22 09:40





  Famotidine 20 Mg Tab  PO   20 mg





  DAILY NANCY   Administration


 


Hydralazine HCl  10 mg  05/09/22 18:21  05/10/22 08:17





  Hydralazine 20 Mg/1 Ml Inj  IV   10 mg





  Q4HR PRN   Administration





  SBP >/=160; DBP >/=100  


 


Hydralazine HCl  50 mg  05/10/22 12:00  05/11/22 17:10





  Hydralazine 25 Mg Tab  PO   50 mg





  Q6HR NANCY   Administration


 


Sodium Chloride  100 mls @ 999 mls/hr  05/09/22 08:07 





  Nacl 0.9%  IV  





  DENODRE PRN  





  Hypotension  


 


Isosorbide Mononitrate  60 mg  05/10/22 10:00  05/11/22 09:39





  Isosorbide Mononitrate Er 60 Mg Tab  PO   60 mg





  QDAY NANCY   Administration


 


Nifedipine  90 mg  05/10/22 10:00  05/11/22 09:40





  Nifedipine Xl 90 Mg Tab  PO   90 mg





  QDAY NANCY   Administration


 


Ondansetron HCl  4 mg  05/09/22 01:38 





  Ondansetron 4 Mg/2 Ml Inj  IV  





  Q8H PRN  





  Nausea And Vomiting  


 


Sodium Chloride  10 ml  05/09/22 10:00  05/11/22 22:42





  Sodium Chloride 0.9% 10 Ml Flush Syringe  IV   10 ml





  BID NANCY   Administration


 


Sodium Chloride  10 ml  05/09/22 01:38 





  Sodium Chloride 0.9% 10 Ml Flush Syringe  IV  





  PRN PRN  





  LINE FLUSH

## 2022-05-12 NOTE — DISCHARGE SUMMARY
Providers





- Providers


Date of Admission: 


05/09/22 06:39





Attending physician: 


CHANEL MIRANDA MD





                                        





05/09/22 01:09


Consult to Physician [CONS] Urgent 


   Comment: Dr. Mendoza spoke with Dr. Fallon @ 0119


   Consulting Provider: VALENTIN FALLON


   Physician Instructions: 


   Reason For Exam: esrd





05/09/22 01:34


Consult to Physician [CONS] Urgent 


   Comment: Dr. Mendoza spoke with Dr. Srivastava @ 0137


   Consulting Provider: JAMEY SRIVASTAVA


   Physician Instructions: 


   Reason For Exam: htn emergency





05/11/22 08:40


Consult to Physician [CONS] Routine 


   Comment: 


   Consulting Provider: JEREMIE DIAZ


   Physician Instructions: 


   Reason For Exam: tongue bleed ?dysfunctional platelet in a HD PT





05/11/22 11:39


Consult to Interventional Radiology [CONS] Routine 


   Consulting Provider: ANNDA EWING


   Reason For Exam: To remove Tunnel hemodialysis catheter.


   Place consult to:: DR. EWING


   Notified:: OFFICE


   Phone number called:: [phone]


   Was contact made?: Yes


   If yes, spoke with:: PATRICK


   Time called:: 12:11


   Comment:: LEFT MESSAGE ON A.S. @ 12:09 & PATRICK CALL BACK











Primary care physician: 


LIBBY BREWSTER








Hospitalization


Reason for admission: Oral bleed


Condition: Stable


Hospital course: 


This is a 48-year-old male with known past medical history of ESRD on HD(M,W,F),

 DVT on Eliquis, Brugada syndrome, HTN, MI s/p stent placement, and COPD, O2 dep

endent at home admitted for pulmonary edema and hypertensive urgency.





Hospital Course to Date:


5/9: Patient seen and examined at the bedside. Patient is fully AAO, on HHFL at 

40% and 20L SPO2 above 92%. Patient still with dyspnea with exertion, plan for 

HD today per Nephrology. Oral bleeding resolved, H&H remains strable, will 

resume home AC- Eliquis. Left upper chest Permacath and LUE Av-fistula noted, 

per patient plan was to remove permacath two weeks ago however appointment was 

rescheduled due to unforeseen events. D/w Nephro plan for possible permacath 

removal sometimes this week. Patient remains on Nitro gtt, still hypertensive 

this am. Will resume patient's home antihypertensive regimen. Titrate Nitro gtt 

for SBP less than 160. [facility] is also following. Diagnosis and plan of care 

discussed with patient at the bedside. Patient verbalized understanding and 

agreed with current plan of care. All questions and concerns addressed at this 

time. Team will continue to f/u with any further updates.





5/10: Tolerated HD yesterday, 4.5L removed. Now stable on 3L NC SPO2 at 100%. 

Patient is off Nitro gtt this am, SBP in he 170s, home antihypertensive resumed.

 Patient reported that he had a heart attack in the past and a stent was placed 

at that time but he does not see a cardiologist. 2D echo pending. D/W [facility] 

patient is stable for transfer to the floor.








5/11: Patient seen and examined this morning still with bleeding his tongue.  

This is a clear demarcated area of laceration but doubt that this is could have 

been caused by bite.  Hemoglobin is 7.2 I have ordered DDAVP and consultation 

with the nephrologist.  Also ordered FFP and consultation with a hematologist 

that I consulted.  We will hold his Eliquis at this time.  Also on Eliquis he 

probably will benefit from the lower dose being that he has renal impediment.  

He may need transfusion we will monitor H&H in a.m..  I have provided extensive 

counseling to the patient about his underlying clinical condition.  We will 

check a vitamin D level and further management as tolerated


I will try to find out when this patient experienced his DVT and if he truly 

should still remain on Eliquis so this can be stopped safely.





5/12: Patient seen and examined this morning no further tongue bleeding noted.  

He did have an episode of nasal bleed last night.  This was transient.  His 

treatment so far has included DD  and a unit of FFP which seemed to have 

helped.  He was seen by vascular and noted to have a left upper arm fistula that

 is functioning as a result today indwelling left chest wall tunneled 

hemodialysis catheter was removed.  Catheter cuff was noted to be dissected free

 and presumably completed in the Cath Lab due to noted resistance.  Otherwise 

clinically the patient has been doing well I did discuss with him that otologist

 we also saw the patient and a work-up for coagulopathy was ensued.  They noted 

that the patient was started on Eliquis about 2 months ago the patient stated 

was 3 months ago due to DVT, ideally he is completed the course of the Eliquis 

and as a result we will discontinue totally per hematology recommendation of my 

discussion with the.   Iron study work-up did reveal severe iron deficiency 

anemia for which patient will be discharged on iron and also recommend to 

follow-up with hematologist outpatient further work-up is pending.  Nephrology 

was comfortable to for patient to be discharged with a hemoglobin of 7.1 and no 

further transfusion needed at this time.  Extensive counseling was discussed 

with the patient based on this finding again no seizure was noted during this 

hospital stay and he is to follow-up with his primary care physician for further

 management.











Assessment and Plan





#Acute Hypoxemic Respiratory Failure 2/2


#Pulmonary Edema


#COPD, O2 Dependent


- Presented with SOB, dyspnea required HHFL


- CXR suggesting pulmonary edema


- HD yesterday, 4.5L removed


- Patient down to 3L NC this am SPO2 at 100%


- CCM consulted, appreciate recommendations


- Continue bronchodilators per CCM


- Continue O2 supplementation, wean to home O2 at 2L as tolerated


- Continue HD per Nephro


- Continue SPO2 monitoring for SPO2 goal above 92%





#Hypertensive Urgency


#Cardiomegaly


#Possible CHF


#H/o Brugada syndrome and MI with Stent placement


- Presented with SBP in the 190s requiring Nitroglcerin gtt


- Nitro gtt off this am, SBP still in the 170


- Home antihypertensives and antiplatelets resumed


- 2D echo pending


- Continue blood pressure monitor per protocol


- PRN hydralazine for SBP greater than 160





#End Stage Renal Disease(ESRD) on HD


- regular days M,W,F


- Per patient last HD day was on Friday prior to admit


- Nephrology on consult, appreciated recommendation


- HD yesterday, 4.5L removed


- Continue HD per Nephro


- Strict intake and output


- Avoid nephrotoxic medications; Renally dose medications 


- Monitor and replace electrolytes as needed





#Anemia of Chronic Disease


#Hemorrhage of Tongue-resolved


- Presented with heavy oral bleeding after bitting his tongue


- s/p DDAVP in the ED


- Bleeding resolved, H&H remains stable


- Continue to monitor for s/s of any bleeding


- Trend H&H


- Transfuse for hemoglobin less than 7


- Continue Epogen per Nephro





#H/o DVT


-Hold Eliquis, SCDs for now





#GI/DVT Prophylaxis


- PPI- Pepcid


-Hold, SCDs Eliquis








Disposition: 01 HOME / SELF CARE / HOMELESS


Final Discharge Diagnosis (Prints w/discharge instructions): Acute Hypoxemic 

Respiratory Failure 2/2.  #Pulmonary Edema.  #COPD, O2 Dependent.  #Hypertensive

 Urgency.  #Cardiomegaly.  #Possible CHF.  #H/o Brugada syndrome and MI with 

Stent placement.  #End Stage Renal Disease(ESRD) on HD.  #Anemia of Chronic 

Disease.  #Secondary coagulopathy.  #Hemorrhage of Tongue-resolved


Time spent for discharge: 35 MINS





Core Measure Documentation





- Palliative Care


Palliative Care/ Comfort Measures: Not Applicable





- Core Measures


Any of the following diagnoses?: none





Exam





- Physical Exam


Narrative exam: 


General appearance: Present: no acute distress, well-nourished





- EENT


Eyes: Present: PERRL, tongue laceration withOUT obvious bleeding noted TODAY.


ENT: hearing intact





- Neck


Neck: Present: normal ROM





- Respiratory


Respiratory effort: normal


Respiratory: bilateral: diminished





- Cardiovascular


Rhythm: regular


Heart Sounds: Present: S1 & S2





- Extremities


Extremities: no ischemia, pulses intact, pulses symmetrical


Peripheral Pulses: within normal limits, LEFT UPPER EXT AV FISTULA WITH PALPABLE

 THRILL





- Abdominal


General gastrointestinal: soft, non-distended, normal bowel sounds





- Integumentary


Integumentary: Present: clear, warm, dry





- Psychiatric


Psychiatric: appropriate mood/affect, cooperative





- Neurologic


Neurologic: CNII-XII intact, moves all extremities





- Allied Health


Allied health notes reviewed: nursing











- Constitutional


Vitals: 


                                        











Temp Pulse Resp BP Pulse Ox


 


 98.7 F   80   18   151/88   98 


 


 05/12/22 04:55  05/12/22 04:55  05/12/22 04:55  05/12/22 04:55  05/12/22 04:55














Plan


Activity: advance as tolerated, fall precautions


Diet: renal


Special Instructions: restrict fluid intake to (1200CC/DAY), record daily 

weights, record daily BP diary


Plan of Treatment: 


Please stop Eliquis.


Follow up with: 


LIBBY BREWSTER MD [Primary Care Provider] - 3-5 Days


VALENTIN FALLON MD [Staff Physician] - 7 Days


ARCENIO SHIN MD [Staff Physician] - 7 Days


Prescriptions: 


Hydralazine HCl 50 mg PO TID #90 tab


Famotidine [Pepcid] 20 mg PO DAILY #30 tablet


NIFEdipine XL [Procardia Xl] 90 mg PO QDAY #60 tablet

## 2022-05-12 NOTE — EVENT NOTE
Date: 05/12/22





Saw and evaluated patient at bedside.  Patient with left upper arm fistula that 

is functioning.  Patient has an indwelling left chest wall tunneled hemodialysis

catheter.  Prominent chest wall veins identified.  The catheter cuff was 

dissected free.  There appears to be some resistance to removing the remainder 

of the catheter and the procedure at bedside was therefore terminated.  The 

patient will be brought down to the Cath Lab for better visualization for 

PermCath removal.  Patient stable throughout the procedure.

## 2022-05-31 NOTE — EMERGENCY DEPARTMENT REPORT
ED Shortness of Breath HPI





- General


Chief Complaint: Dyspnea/Respdistress


Stated Complaint: PJ


Time Seen by Provider: 05/31/22 21:28


Source: patient, EMS


Mode of arrival: Stretcher


Limitations: No Limitations





- History of Present Illness


Initial Comments: 





Patient is 48 years old male with history of end-stage renal disease on 

hemodialysis.  Patient got dialyzed yesterday.  Patient is also COPD with oxygen

dependent at home.  Patient brought to the emergency room via EMS for evaluation

of shortness of breath and difficulty in breathing that started this morning.  

Patient denies any fever or chills.  He denies any chest pain.  Patient stated 

that he is compliant with his dialysis.


MD Complaint: shortness of breath, cough


-: Sudden, This afternoon


Known History Of: COPD, congestive heart failure





- Related Data


                                Home Medications











 Medication  Instructions  Recorded  Confirmed  Last Taken


 


Clopidogrel [Plavix] 75 mg PO QDAY 05/09/22 05/09/22 Unknown


 


Doxazosin [Cardura] 4 mg PO BID 05/09/22 05/09/22 Unknown


 


Isosorbide Mononitrate [Isosorbide 60 mg PO QDAY 05/09/22 05/09/22 Unknown





Mononitrate ER]    








                                  Previous Rx's











 Medication  Instructions  Recorded  Last Taken  Type


 


carvediloL [Coreg] 6.25 mg PO BID #60 tablet 01/15/14 Unknown Rx


 


Famotidine [Pepcid] 20 mg PO DAILY #30 tablet 05/12/22 Unknown Rx


 


Hydralazine HCl 50 mg PO TID #90 tab 05/12/22 Unknown Rx


 


NIFEdipine XL [Procardia Xl] 90 mg PO QDAY #60 tablet 05/12/22 Unknown Rx











                                    Allergies











Allergy/AdvReac Type Severity Reaction Status Date / Time


 


No Known Allergies Allergy   Unverified 01/10/14 09:48














ED Review of Systems


ROS: 


Stated complaint: PJ


Other details as noted in HPI





Comment: All other systems reviewed and negative


Constitutional: denies: chills, fever


Respiratory: cough, orthopnea, shortness of breath, SOB with exertion, SOB at 

rest.  denies: wheezing


Cardiovascular: denies: chest pain


Gastrointestinal: denies: abdominal pain, nausea, vomiting





ED Past Medical Hx





- Past Medical History


Hx Hypertension: Yes


Hx Congestive Heart Failure: No


Hx Diabetes: No


Hx Renal Disease: Yes


Hx Asthma: No


Hx COPD: No


Additional medical history: Brugada Syndrome, ventral abdominal hernia





- Social History


Smoking Status: Never Smoker





- Medications


Home Medications: 


                                Home Medications











 Medication  Instructions  Recorded  Confirmed  Last Taken  Type


 


carvediloL [Coreg] 6.25 mg PO BID #60 tablet 01/15/14 05/10/22 Unknown Rx


 


Clopidogrel [Plavix] 75 mg PO QDAY 05/09/22 05/09/22 Unknown History


 


Doxazosin [Cardura] 4 mg PO BID 05/09/22 05/09/22 Unknown History


 


Isosorbide Mononitrate [Isosorbide 60 mg PO QDAY 05/09/22 05/09/22 Unknown 

History





Mononitrate ER]     


 


Famotidine [Pepcid] 20 mg PO DAILY #30 tablet 05/12/22  Unknown Rx


 


Hydralazine HCl 50 mg PO TID #90 tab 05/12/22  Unknown Rx


 


NIFEdipine XL [Procardia Xl] 90 mg PO QDAY #60 tablet 05/12/22  Unknown Rx














ED Physical Exam





- General


Limitations: No Limitations


General appearance: alert, in distress





- Head


Head exam: Present: atraumatic, normocephalic, normal inspection





- Eye


Eye exam: Present: normal appearance





- ENT


ENT exam: Present: normal exam, normal orophraynx, mucous membranes moist





- Neck


Neck exam: Present: normal inspection, full ROM.  Absent: tenderness, 

meningismus





- Respiratory


Respiratory exam: Present: respiratory distress, rales, accessory muscle use, d

ecreased breath sounds.  Absent: wheezes, rhonchi





- Cardiovascular


Cardiovascular Exam: Present: regular rate, normal rhythm, normal heart sounds





- GI/Abdominal


GI/Abdominal exam: Present: soft, normal bowel sounds.  Absent: distended, 

tenderness, guarding, rebound, rigid, organomegaly, mass, bruit, pulsatile mass,

hernia





- Extremities Exam


Extremities exam: Present: normal inspection, full ROM, normal capillary refill.

 Absent: tenderness, calf tenderness





- Back Exam


Back exam: Present: normal inspection, full ROM.  Absent: CVA tenderness (R), 

CVA tenderness (L)





- Neurological Exam


Neurological exam: Present: alert, oriented X3, CN II-XII intact, reflexes 

normal.  Absent: motor sensory deficit





- Psychiatric


Psychiatric exam: Present: normal mood





- Skin


Skin exam: Present: warm, intact, normal color





ED Course


                                   Vital Signs











  05/31/22 05/31/22 05/31/22





  20:23 21:25 21:31


 


Temperature 98.4 F  


 


Pulse Rate 84 75 76


 


Respiratory 26 H 22 23





Rate   


 


Blood Pressure   


 


Blood Pressure 207/130  





[Right]   


 


O2 Sat by Pulse 94 100 





Oximetry   














  05/31/22 05/31/22 05/31/22





  21:45 22:01 22:10


 


Temperature   


 


Pulse Rate 77 85 79


 


Respiratory 27 H 24 28 H





Rate   


 


Blood Pressure 199/111 205/109 187/102


 


Blood Pressure   





[Right]   


 


O2 Sat by Pulse 100 100 99





Oximetry   














  05/31/22 05/31/22 05/31/22





  22:15 22:31 22:45


 


Temperature   


 


Pulse Rate 84 85 80


 


Respiratory 29 H 27 H 29 H





Rate   


 


Blood Pressure 178/100 186/97 188/100


 


Blood Pressure   





[Right]   


 


O2 Sat by Pulse 100 100 99





Oximetry   














  05/31/22 05/31/22 05/31/22





  23:01 23:15 23:31


 


Temperature   


 


Pulse Rate 79 76 77


 


Respiratory 24 26 H 26 H





Rate   


 


Blood Pressure 187/102 185/105 193/100


 


Blood Pressure   





[Right]   


 


O2 Sat by Pulse 99 99 100





Oximetry   














ED Medical Decision Making





- Lab Data


Result diagrams: 


                                 05/31/22 21:49





                                 05/31/22 21:49





- Radiology Data


Radiology results: report reviewed





- Medical Decision Making





Patient is 48 years old male with history of end-stage renal disease on 

hemodialysis.  Patient got dialyzed yesterday.  Patient is also COPD with oxygen

 dependent at home.  Patient brought to the emergency room via EMS for 

evaluation of shortness of breath and difficulty in breathing that started this 

morning.  Patient denies any fever or chills.  He denies any chest pain.  

Patient stated that he is compliant with his dialysis.





Patient started on BiPAP.  Chest x-ray showed pulmonary edema.  Labs reviewed 

and is unremarkable except for his chronic kidney function impairment.  Patient 

received hydralazine 20 mg IV for his elevated blood pressure.  I discussed the 

patient with Dr. Jackson, he agreed to admit the patient to medical service for 

further management.





I discussed patient with Dr. Berrios, he agreed to admit the patient to medical 

service for further management.


Critical Care Time: Yes


Critical care time in (mins) excluding proc time.: 35


Critical care attestation.: 


If time is entered above; I have spent that time in minutes in the direct care 

of this critically ill patient, excluding procedure time.








ED Disposition


Clinical Impression: 


 End-stage renal disease needing dialysis, Volume overload





Disposition: 09 ADMITTED AS INPATIENT


Is pt being admited?: Yes


Condition: Stable

## 2022-05-31 NOTE — XRAY REPORT
CHEST 1 VIEW 



INDICATION / CLINICAL INFORMATION: Dyspnea. 



COMPARISON: Chest x-ray 5/9/2022



FINDINGS:



SUPPORT DEVICES: Interval removal of hemodialysis catheter.

HEART / MEDIASTINUM: Mild cardiomegaly stable. 

LUNGS / PLEURA: Increased interstitial markings in the mid chest and perihilar regions with additiona
l airspace opacities bilaterally within the bases suggests volume overload and/or pulmonary edema.  

BONES: No significant osseous abnormality.

ADDITIONAL FINDINGS: No significant additional findings.







IMPRESSION:

1. Volume overload and/or pulmonary edema are suggested.



Signer Name: Sebastian Fernández II, MD 

Signed: 5/31/2022 10:49 PM

Workstation Name: [id number]

## 2022-06-01 NOTE — CONSULTATION
History of Present Illness





- Reason for Consult


end stage renal disease (48yr M with h/o HTN, ESRD on HD at [facility] q mwf c/o SOB 

with PJ requiring BIPAP. CXRR suggests Pulm Congestion)





Past History


Past Medical History: ESRD, hypertension, renal failure, other (Brugada 

Syndrome, ventral abdominal hernia)


Past Surgical History: No surgical history


Social history: no significant social history


Family history: hypertension





Medications and Allergies


                                    Allergies











Allergy/AdvReac Type Severity Reaction Status Date / Time


 


No Known Allergies Allergy   Unverified 01/10/14 09:48











                                Home Medications











 Medication  Instructions  Recorded  Confirmed  Last Taken  Type


 


NIFEdipine XL [Procardia Xl] 90 mg PO QDAY #60 tablet 05/12/22 06/01/22 05/31/22

09:00 Rx


 


Labetalol HCl [Labetalol 300mg TAB] 300 mg PO Q12H 06/01/22 06/01/22 05/31/22 

09:00 History











Active Meds: 


Active Medications





Acetaminophen (Acetaminophen 325 Mg Tab)  650 mg PO Q4H PRN


   PRN Reason: Pain MILD(1-3)/Fever >100.5/HA


Albuterol (Albuterol 2.5 Mg/3 Ml Nebu)  2.5 mg IH Q3HRT PRN


   PRN Reason: Shortness Of Breath


Albuterol/Ipratropium (Ipratropium/Albuterol Sulfate 3 Ml Ampul.Neb)  1 ampul IH

Q6HRT [facility]


   Last Admin: 06/01/22 08:23 Dose:  1 ampul


   


Carvedilol (Carvedilol 6.25 Mg Tab)  6.25 mg PO BID@0800,1700 [facility]


   Last Admin: 06/01/22 06:53 Dose:  6.25 mg


   


Clonidine HCl (Clonidine 0.2 Mg Tab)  0.2 mg PO Q4H PRN


   PRN Reason: Blood Pressure


Clopidogrel Bisulfate (Clopidogrel 75 Mg Tab)  75 mg PO QDAY [facility]


   Last Admin: 06/01/22 10:04 Dose:  75 mg


   


Doxazosin Mesylate (Doxazosin 4 Mg Tab)  4 mg PO BID [facility]


   Last Admin: 06/01/22 06:58 Dose:  4 mg


   


Epoetin Ranjeet-epbx (Epoetin Ranjeet-Epbx 10,000 Unit/1 Ml Vial)  10,000 unit SUB-Q 

DEONDRE PRN


   PRN Reason: hemodialysis


Famotidine (Famotidine 20 Mg Tab)  20 mg PO QAM [facility]


   Last Admin: 06/01/22 10:04 Dose:  20 mg


   


Heparin Sodium (Porcine) (Heparin 5,000 Unit/1 Ml Vial)  5,000 unit SUB-Q Q12HR 

[facility]


   Last Admin: 06/01/22 10:04 Dose:  5,000 unit


   


Heparin Sodium (Porcine) (Heparin 10,000 Units/10 Ml Vial)  3,000 unit IV DEONDRE 

PRN


   PRN Reason: hemodialysis


Hydralazine HCl (Hydralazine 20 Mg/1 Ml Inj)  10 mg IV Q6H PRN


   PRN Reason: Blood Pressure


   Last Admin: 06/01/22 02:34 Dose:  10 mg


   


Hydralazine HCl (Hydralazine 100 Mg Tab)  100 mg PO Q8HR [facility]


   Last Admin: 06/01/22 10:04 Dose:  100 mg


   


Sodium Chloride (Nacl 0.9%)  100 mls @ 999 mls/hr IV DEONDRE PRN


   PRN Reason: Hypotension


Isosorbide Mononitrate (Isosorbide Mononitrate Er 60 Mg Tab)  60 mg PO QDAY [facility]


   Last Admin: 06/01/22 06:53 Dose:  60 mg


   


Labetalol HCl (Labetalol 20 Mg/4 Ml Inj)  10 mg IV Q4H PRN


   PRN Reason: sbp>160


Morphine Sulfate (Morphine 2 Mg/1 Ml Inj)  2 mg IV Q4H PRN


   PRN Reason: Pain, Moderate (4-6)


Morphine Sulfate (Morphine 4 Mg/1 Ml Inj)  4 mg IV Q4H PRN


   PRN Reason: Pain , Severe (7-10)


Nifedipine (Nifedipine Xl 90 Mg Tab)  90 mg PO QDAY [facility]


   Last Admin: 06/01/22 06:54 Dose:  90 mg


   


Ondansetron HCl (Ondansetron 4 Mg/2 Ml Inj)  4 mg IV Q8H PRN


   PRN Reason: Nausea And Vomiting


Sodium Chloride (Sodium Chloride 0.9% 10 Ml Flush Syringe)  10 ml IV BID [facility]


   Last Admin: 06/01/22 10:04 Dose:  10 ml


   


Sodium Chloride (Sodium Chloride 0.9% 10 Ml Flush Syringe)  10 ml IV PRN PRN


   PRN Reason: LINE FLUSH











Review of Systems


Cardiovascular: shortness of breath, no chest pain, no orthopnea, no 

palpitations


Respiratory: shortness of breath, no cough


Gastrointestinal: no abdominal pain, no nausea, no vomiting





Exam





- Vital Signs


Vital signs: 


                                   Vital Signs











Temp Pulse Resp BP Pulse Ox


 


 98.4 F   84   26 H  207/130   94 


 


 05/31/22 20:23  05/31/22 20:23  05/31/22 20:23  05/31/22 20:23  05/31/22 20:23














- General Appearance


General appearance: other (Awake & responsive)


EENT: PERRL


Neck: Present: neck supple.  Absent: JVD/HJR


Respiratory: Rales


Heart: regular, S1S2


Gastrointestinal: Present: other (Soft).  Absent: tenderness


Integumentary: no rash


Neurologic: no focal deficit


Psychiatric: mood/affect appropriate


Additional exam: 





HD Access: Lt arm AVF





Results





- Lab Results





                                 05/31/22 21:49





                                 06/01/22 04:32


                             Most recent lab results











Calcium  9.4 mg/dL (8.4-10.2)   06/01/22  04:32    














Assessment and Plan





Pulm Edema - UF on HD as tolerated today. Repeat Isolated UF in am





ESRD - HD today with UF as above





Uncontrolled HTN - Adjust meds for better control. Would not advise Clonidine in

this pt with unclear compliance





Thanks, will f/u with you

## 2022-06-01 NOTE — ELECTROCARDIOGRAPH REPORT
[facility]

                                       

Test Date:    2022               Test Time:    20:30:03

Pat Name:     KIRBY GAUTHIER            Department:   

Patient ID:   [id number]          Room:         A265 1

Gender:       M                        Technician:   MARLENE

:          1973               Requested By: JOVANNY DOSS

Order Number: [id number]              Reading MD:   Austin Chen

                                 Measurements

Intervals                              Axis          

Rate:         77                       P:            68

NJ:           210                      QRS:          52

QRSD:         104                      T:            68

QT:           388                                    

QTc:          440                                    

                           Interpretive Statements

Sinus rhythm

Prolonged NJ interval

Left atrial enlargement



nonspecific st-t

Compared to ECG 2022 23:58:10

First degree AV block now present

Atrial abnormality now present

ST (T wave) deviation now present

Ventricular premature complex(es) no longer present

Electronically Signed On 2022 9:07:07 EDT by Austin Chen

## 2022-06-01 NOTE — HISTORY AND PHYSICAL REPORT
History of Present Illness


Date of examination: 06/01/22


Date of admission: 


06/01/22


Chief complaint: 





Dyspnea


Respiratory distress


History of present illness: 





 48 years old male with history of end-stage renal disease on hemodialysis and 

COPD was brought to the emergency room because of shortness of breath and 

respiratory distress.  Patient got hemodialyzed yesterday.  Patient brought to 

the emergency room via EMS for evaluation of shortness of breath and difficulty 

in breathing that started this morning.  Patient denies any fever or chills.  He

denies any chest pain.  Patient stated that he is compliant with his dialysis.





In the emergency room patient is found to have volume overload.  Subsequently 

patient was put on BiPAP and case was discussed with on-call nephrology who will

see the patient and hemodialyzed the patient in the morning





Past History


Past Medical History: ESRD, hypertension, renal failure, other (Brugada 

Syndrome, ventral abdominal hernia)


Past Surgical History: No surgical history


Social history: no significant social history


Family history: hypertension





Medications and Allergies


                                    Allergies











Allergy/AdvReac Type Severity Reaction Status Date / Time


 


No Known Allergies Allergy   Unverified 01/10/14 09:48











                                Home Medications











 Medication  Instructions  Recorded  Confirmed  Last Taken  Type


 


carvediloL [Coreg] 6.25 mg PO BID #60 tablet 01/15/14 05/10/22 Unknown Rx


 


Clopidogrel [Plavix] 75 mg PO QDAY 05/09/22 05/09/22 Unknown History


 


Doxazosin [Cardura] 4 mg PO BID 05/09/22 05/09/22 Unknown History


 


Isosorbide Mononitrate [Isosorbide 60 mg PO QDAY 05/09/22 05/09/22 Unknown 

History





Mononitrate ER]     


 


Famotidine [Pepcid] 20 mg PO DAILY #30 tablet 05/12/22  Unknown Rx


 


Hydralazine HCl 50 mg PO TID #90 tab 05/12/22  Unknown Rx


 


NIFEdipine XL [Procardia Xl] 90 mg PO QDAY #60 tablet 05/12/22  Unknown Rx














Review of Systems


All systems: negative


Cardiovascular: shortness of breath, dyspnea on exertion


Respiratory: cough, shortness of breath, dyspnea on exertion





Exam





- Constitutional


Vitals: 


                                        











Temp Pulse Resp BP Pulse Ox


 


 98.4 F   78   24   198/104   99 


 


 05/31/22 20:23  06/01/22 00:31  06/01/22 00:31  06/01/22 00:31  06/01/22 00:31











General appearance: Present: no acute distress, well-nourished





- EENT


Eyes: Present: PERRL


ENT: hearing intact, clear oral mucosa





- Neck


Neck: Present: supple, normal ROM





- Respiratory


Respiratory effort: normal


Respiratory: bilateral: diminished





- Cardiovascular


Heart Sounds: Present: S1 & S2.  Absent: rub, click





- Extremities


Extremities: pulses symmetrical, No edema


Peripheral Pulses: within normal limits





- Abdominal


General gastrointestinal: Present: soft, non-tender, non-distended, normal bowel

sounds


Male genitourinary: Present: normal





- Integumentary


Integumentary: Present: clear, warm, dry





- Musculoskeletal


Musculoskeletal: gait normal, strength equal bilaterally





- Psychiatric


Psychiatric: appropriate mood/affect, intact judgment & insight





- Neurologic


Neurologic: CNII-XII intact, moves all extremities





HEART Score





- HEART Score


Troponin: 


                                        











Troponin T  0.093 ng/mL (0.00-0.029)  H  05/31/22  21:49    














Results





- Labs


CBC & Chem 7: 


                                 05/31/22 21:49





                                 05/31/22 21:49


Labs: 


                             Laboratory Last Values











WBC  7.2 K/mm3 (4.5-11.0)   05/31/22  21:49    


 


RBC  3.19 M/mm3 (3.65-5.03)  L  05/31/22  21:49    


 


Hgb  8.8 gm/dl (11.8-15.2)  L  05/31/22  21:49    


 


Hct  27.5 % (35.5-45.6)  L  05/31/22  21:49    


 


MCV  86 fl (84-94)   05/31/22  21:49    


 


MCH  28 pg (28-32)   05/31/22  21:49    


 


MCHC  32 % (32-34)   05/31/22  21:49    


 


RDW  18.0 % (13.2-15.2)  H  05/31/22  21:49    


 


Plt Count  168 K/mm3 (140-440)   05/31/22  21:49    


 


Lymph % (Auto)  6.0 % (13.4-35.0)  L  05/31/22  21:49    


 


Mono % (Auto)  4.7 % (0.0-7.3)   05/31/22  21:49    


 


Eos % (Auto)  3.3 % (0.0-4.3)   05/31/22  21:49    


 


Baso % (Auto)  0.6 % (0.0-1.8)   05/31/22  21:49    


 


Lymph # (Auto)  0.4 K/mm3 (1.2-5.4)  L  05/31/22  21:49    


 


Mono # (Auto)  0.3 K/mm3 (0.0-0.8)   05/31/22  21:49    


 


Eos # (Auto)  0.2 K/mm3 (0.0-0.4)   05/31/22  21:49    


 


Baso # (Auto)  0.0 K/mm3 (0.0-0.1)   05/31/22  21:49    


 


Seg Neutrophils %  85.4 % (40.0-70.0)  H  05/31/22  21:49    


 


Seg Neutrophils #  6.2 K/mm3 (1.8-7.7)   05/31/22  21:49    


 


PT  15.3 Sec. (12.2-14.9)  H  05/31/22  21:49    


 


INR  1.09  (0.87-1.13)   05/31/22  21:49    


 


APTT  31.3 Sec. (24.2-36.6)   05/31/22  21:49    


 


Sodium  138 mmol/L (137-145)   05/31/22  21:49    


 


Potassium  3.9 mmol/L (3.6-5.0)   05/31/22  21:49    


 


Chloride  98.1 mmol/L ()   05/31/22  21:49    


 


Carbon Dioxide  24 mmol/L (22-30)   05/31/22  21:49    


 


Anion Gap  20 mmol/L  05/31/22  21:49    


 


BUN  55 mg/dL (9-20)  H  05/31/22  21:49    


 


Creatinine  9.2 mg/dL (0.8-1.3)  H  05/31/22  21:49    


 


Estimated GFR  7 ml/min  05/31/22  21:49    


 


BUN/Creatinine Ratio  6 %  05/31/22  21:49    


 


Glucose  73 mg/dL ()  L  05/31/22  21:49    


 


Lactic Acid  1.10 mmol/L (0.7-2.0)   05/31/22  21:49    


 


Calcium  9.0 mg/dL (8.4-10.2)   05/31/22  21:49    


 


Troponin T  0.093 ng/mL (0.00-0.029)  H  05/31/22  21:49    














- Imaging and Cardiology


Chest x-ray: report reviewed





Assessment and Plan


VTE prophylaxis?: Chemical


Plan of care discussed with patient/family: Yes





- Patient Problems


(1) End-stage renal disease needing dialysis


Current Visit: Yes   Status: Acute   


Plan to address problem: 


Admit the patient to the medical telemetry.  Put the patient on cardiac diet.  

We also put the patient on BiPAP.  DuoNeb by nebulizer every 4 hours.  Albuterol

via nebulizer every 4 hours as needed.  We discussed the case with on-call 

nephrology for dialysis in the morning.  Recheck BMP in the morning








(2) Volume overload


Current Visit: Yes   Status: Acute   


Plan to address problem: 


     Fluid restriction.  We also put the patient on BiPAP.  DuoNeb by nebulizer 

every 4 hours.  Albuterol via nebulizer every 4 hours as needed.  We discussed 

the case with on-call nephrology for dialysis in the morning.  Recheck BMP in 

the morning








(3) Hypertension


Current Visit: No   Status: Acute   


Plan to address problem: 


Coreg 6.25 mg p.o. twice daily.  Cardura 4 mg p.o. twice daily.  Hydralazine 50 

mg p.o. 3 times daily.  Procardia XL 90 mg p.o. daily








(4) Pulmonary edema


Current Visit: No   Status: Acute   


Plan to address problem: 


Fluid restriction.  We also put the patient on BiPAP.  DuoNeb by nebulizer every

4 hours.  Albuterol via nebulizer every 4 hours as needed.  We discussed the 

case with on-call nephrology for dialysis in the morning.  Recheck BMP in the 

morning








(5) DVT prophylaxis


Current Visit: No   Status: Acute   


Plan to address problem: 


Heparin 5000 units subcu every 12 hours for DVT prophylaxis.  Pepcid 20 mg p.o. 

twice daily for GI prophylaxis.  Patient is a full code

## 2022-06-01 NOTE — PROGRESS NOTE
Assessment and Plan


Assessment and plan: 





History of present illness: 





 48 years old male with history of end-stage renal disease on hemodialysis and 

COPD was brought to the emergency room because of shortness of breath and 

respiratory distress.  Patient got hemodialyzed yesterday.  Patient brought to 

the emergency room via EMS for evaluation of shortness of breath and difficulty 

in breathing that started this morning.  Patient denies any fever or chills.  He

denies any chest pain.  Patient stated that he is compliant with his dialysis.





In the emergency room patient is found to have volume overload.  Subsequently 

patient was put on BiPAP and case was discussed with on-call nephrology who will

see the patient and hemodialyzed the patient in the morning





Hospital Course:


6/1/2022: Home medication restarted, blood pressure control achieved.  Currently

on Venturi mask saturating 98%, will attempt to titrate oxygen down as patient 

sats tolerate.  Patient to go for UF HD today with subsequent treatment 

tomorrow. Plan for dc tomorrow after HD.





Assessment and Plan:


#Hypertensive emergency


- restart home medications: Doxazosin, Procardia, Coreg, hydralazine


- avoid catapres if possible


- hydralazine IV prn, labetalol IV prn


- may require cardene gtt if po and prn IV meds do not control.





#End-stage renal disease needing dialysis


- Hemodialysis per nephrology, on [facility] schedule, compliant


- patient OP nephro doc is Dr. Pan, consulted


- avoid nephrotoxic agents


- monitor I/O's


- renal adjust medications


- consult nephrology


- daily renal profile





# Flash Pulmonary edema


-Chest x-ray consistent with findings of pulmonary edema


currently 98 % on venturi mask, titrate down to nc.


-Likely resulting from poorly controlled bp.


-Fluid restriction.  


- place on BiPAP.  





# Volume overload


 - Fluid restriction. 





#Anemia of CKD


-Hemoglobin 8.8


-Epogen as deemed appropriate by nephrology





# Hypertension


Coreg 6.25 mg p.o. twice daily.  Cardura 4 mg p.o. twice daily.  Hydralazine 50 

mg p.o. 3 times daily.  Procardia XL 90 mg p.o. daily





# DVT prophylaxis 


Heparin 5000 units subcu every 12 hours for DVT prophylaxis.  Pepcid 20 mg p.o. 

twice daily for GI prophylaxis.  Patient is a full code








The high probability of a clinically significant, sudden or life threatening 

deterioration of the [multi] system(s) required my full and direct attention, 

intervention and personal management. The aggregate critical care time was [60] 

minutes. This time is in addition to time spent performing reported procedures 

but includes the following: 





[x] Data Review and interpretation 





[x] Patient assessment and monitoring of vital signs 





[x] Documentation 





[x] Medication orders and management





History


Interval history: 





Patient seen and evaluated on bedside encounter.  No acute complaints.  He 

states that he is compliant with his hemodialysis and all of his home 

antihypertensive medication.  He states that he only takes 2 blood pressure 

medications labetalol and Procardia.





Hospitalist Physical





- Physical exam


Narrative exam: 





Physical Exam:


VITAL SIGNS:  Reviewed.    


GENERAL:  The patient appears normally developed, Vital signs as documented.


HEAD:  No signs of head trauma.


EYES:  Pupils are equal.  Extraocular motions intact.  


EARS:  Hearing grossly intact.


MOUTH:  Oropharynx is normal. 


NECK:  No adenopathy, no JVD.  


CHEST:  Chest with clear breath sounds bilaterally.  No wheezes, rales, or 

rhonchi.  


CARDIAC:  Regular rate and rhythm.  S1 and S2, without murmurs, gallops, or 

rubs.


VASCULAR:  No Edema.  Peripheral pulses normal and equal in all extremities.


ABDOMEN:  Soft, non tender and non distended.  No   rebound or guarding, and no 

masses palpated.   Bowel Sounds normal.


MUSCULOSKELETAL:  Good range of motion of all major joints. Extremities without 

clubbing, cyanosis or edema.  


NEUROLOGIC EXAM:  Alert and oriented x 4. no focal sensory or strength deficits.

  


PSYCHIATRIC:  Mood normal.


SKIN:  detail exam as documented in skin assessment





- Constitutional


Vitals: 


                                        











Temp Pulse Resp BP Pulse Ox


 


 98.4 F   80   23   197/98   96 


 


 05/31/22 20:23  06/01/22 07:01  06/01/22 07:01  06/01/22 07:01  06/01/22 07:01











General appearance: Present: no acute distress, well-nourished





HEART Score





- HEART Score


Troponin: 


                                        











Troponin T  0.083 ng/mL (0.00-0.029)  H  06/01/22  01:03    














Results





- Labs


CBC & Chem 7: 


                                 05/31/22 21:49





                                 06/01/22 04:32


Labs: 


                             Laboratory Last Values











WBC  7.2 K/mm3 (4.5-11.0)   05/31/22  21:49    


 


RBC  3.19 M/mm3 (3.65-5.03)  L  05/31/22  21:49    


 


Hgb  8.8 gm/dl (11.8-15.2)  L  05/31/22  21:49    


 


Hct  27.5 % (35.5-45.6)  L  05/31/22  21:49    


 


MCV  86 fl (84-94)   05/31/22  21:49    


 


MCH  28 pg (28-32)   05/31/22  21:49    


 


MCHC  32 % (32-34)   05/31/22  21:49    


 


RDW  18.0 % (13.2-15.2)  H  05/31/22  21:49    


 


Plt Count  168 K/mm3 (140-440)   05/31/22  21:49    


 


Lymph % (Auto)  6.0 % (13.4-35.0)  L  05/31/22  21:49    


 


Mono % (Auto)  4.7 % (0.0-7.3)   05/31/22  21:49    


 


Eos % (Auto)  3.3 % (0.0-4.3)   05/31/22  21:49    


 


Baso % (Auto)  0.6 % (0.0-1.8)   05/31/22  21:49    


 


Lymph # (Auto)  0.4 K/mm3 (1.2-5.4)  L  05/31/22  21:49    


 


Mono # (Auto)  0.3 K/mm3 (0.0-0.8)   05/31/22  21:49    


 


Eos # (Auto)  0.2 K/mm3 (0.0-0.4)   05/31/22  21:49    


 


Baso # (Auto)  0.0 K/mm3 (0.0-0.1)   05/31/22  21:49    


 


Seg Neutrophils %  85.4 % (40.0-70.0)  H  05/31/22  21:49    


 


Seg Neutrophils #  6.2 K/mm3 (1.8-7.7)   05/31/22  21:49    


 


PT  15.3 Sec. (12.2-14.9)  H  05/31/22  21:49    


 


INR  1.09  (0.87-1.13)   05/31/22  21:49    


 


APTT  31.3 Sec. (24.2-36.6)   05/31/22  21:49    


 


Sodium  136 mmol/L (137-145)  L  06/01/22  04:32    


 


Potassium  4.2 mmol/L (3.6-5.0)   06/01/22  04:32    


 


Chloride  97.2 mmol/L ()  L  06/01/22  04:32    


 


Carbon Dioxide  21 mmol/L (22-30)  L  06/01/22  04:32    


 


Anion Gap  22 mmol/L  06/01/22  04:32    


 


BUN  62 mg/dL (9-20)  H  06/01/22  04:32    


 


Creatinine  9.8 mg/dL (0.8-1.3)  H  06/01/22  04:32    


 


Estimated GFR  7 ml/min  06/01/22  04:32    


 


BUN/Creatinine Ratio  6 %  06/01/22  04:32    


 


Glucose  97 mg/dL ()   06/01/22  04:32    


 


Lactic Acid  1.10 mmol/L (0.7-2.0)   05/31/22  21:49    


 


Calcium  9.4 mg/dL (8.4-10.2)   06/01/22  04:32    


 


Troponin T  0.083 ng/mL (0.00-0.029)  H  06/01/22  01:03    


 


Triglycerides  43 mg/dL (2-149)   06/01/22  01:03    


 


Cholesterol  150 mg/dL ()   06/01/22  01:03    


 


LDL Cholesterol Direct  54 mg/dL ()   06/01/22  01:03    


 


HDL Cholesterol  87 mg/dL (40-59)  H  06/01/22  01:03    


 


Cholesterol/HDL Ratio  1.72 %  06/01/22  01:03    














Active Medications





- Current Medications


Current Medications: 














Generic Name Dose Route Start Last Admin





  Trade Name Freq  PRN Reason Stop Dose Admin


 


Acetaminophen  650 mg  06/01/22 01:35 





  Acetaminophen 325 Mg Tab  PO  





  Q4H PRN  





  Pain MILD(1-3)/Fever >100.5/HA  


 


Albuterol  2.5 mg  06/01/22 01:35 





  Albuterol 2.5 Mg/3 Ml Nebu  IH  





  Q3HRT PRN  





  Shortness Of Breath  


 


Albuterol/Ipratropium  1 ampul  06/01/22 02:00  06/01/22 03:31





  Ipratropium/Albuterol Sulfate 3 Ml Ampul.Neb  IH   1 ampul





  Q6HRT [facility]   Administration


 


Carvedilol  6.25 mg  06/01/22 06:00  06/01/22 06:53





  Carvedilol 6.25 Mg Tab  PO   6.25 mg





  BID@0800,1700 [facility]   Administration


 


Clonidine HCl  0.2 mg  06/01/22 04:52 





  Clonidine 0.2 Mg Tab  PO  





  Q4H PRN  





  Blood Pressure  


 


Clopidogrel Bisulfate  75 mg  06/01/22 10:00 





  Clopidogrel 75 Mg Tab  PO  





  QDAY [facility]  


 


Doxazosin Mesylate  4 mg  06/01/22 06:00  06/01/22 06:58





  Doxazosin 4 Mg Tab  PO   4 mg





  BID [facility]   Administration


 


Epoetin Ranjeet-epbx  10,000 unit  06/01/22 07:52 





  Epoetin Ranjeet-Epbx 10,000 Unit/1 Ml Vial  SUB-Q  





  DEONDRE PRN  





  hemodialysis  


 


Famotidine  20 mg  06/01/22 10:00 





  Famotidine 20 Mg Tab  PO  





  QAM [facility]  


 


Heparin Sodium (Porcine)  5,000 unit  06/01/22 10:00 





  Heparin 5,000 Unit/1 Ml Vial  SUB-Q  





  Q12HR [facility]  


 


Heparin Sodium (Porcine)  3,000 unit  06/01/22 07:52 





  Heparin 10,000 Units/10 Ml Vial  IV  





  DEONDRE PRN  





  hemodialysis  


 


Hydralazine HCl  10 mg  06/01/22 03:00  06/01/22 02:34





  Hydralazine 20 Mg/1 Ml Inj  IV   10 mg





  Q6H PRN   Administration





  Blood Pressure  


 


Hydralazine HCl  100 mg  06/01/22 08:11 





  Hydralazine 25 Mg Tab  PO  





  Q8HR [facility]  


 


Sodium Chloride  100 mls @ 999 mls/hr  06/01/22 07:52 





  Nacl 0.9%  IV  





  DEONDRE PRN  





  Hypotension  


 


Isosorbide Mononitrate  60 mg  06/01/22 06:00  06/01/22 06:53





  Isosorbide Mononitrate Er 60 Mg Tab  PO   60 mg





  QDAY [facility]   Administration


 


Labetalol HCl  10 mg  06/01/22 08:09 





  Labetalol 20 Mg/4 Ml Inj  IV  





  Q4H PRN  





  sbp>160  


 


Morphine Sulfate  2 mg  06/01/22 01:35 





  Morphine 2 Mg/1 Ml Inj  IV  





  Q4H PRN  





  Pain, Moderate (4-6)  


 


Morphine Sulfate  4 mg  06/01/22 01:35 





  Morphine 4 Mg/1 Ml Inj  IV  





  Q4H PRN  





  Pain , Severe (7-10)  


 


Nifedipine  90 mg  06/01/22 06:00  06/01/22 06:54





  Nifedipine Xl 90 Mg Tab  PO   90 mg





  QDAY [facility]   Administration


 


Ondansetron HCl  4 mg  06/01/22 01:35 





  Ondansetron 4 Mg/2 Ml Inj  IV  





  Q8H PRN  





  Nausea And Vomiting  


 


Sodium Chloride  10 ml  06/01/22 10:00 





  Sodium Chloride 0.9% 10 Ml Flush Syringe  IV  





  BID [facility]  


 


Sodium Chloride  10 ml  06/01/22 01:35 





  Sodium Chloride 0.9% 10 Ml Flush Syringe  IV  





  PRN PRN  





  LINE FLUSH

## 2022-06-02 NOTE — PROGRESS NOTE
Assessment and Plan





Pulm Edema - Tolerated Isolated





ESRD - HD in am 





HTN - F/u on meds as present





Covid-19 Pneumonia - Unusual infection as pt had Vaccines with Booster & had 

Covid late last year. F/u ID recs





Subjective


Date of service: 06/02/22





Objective





- Vital Signs


Vital signs: 


                               Vital Signs - 12hr











  06/02/22 06/02/22 06/02/22





  04:00 04:40 05:00


 


Temperature 97.2 F L  


 


Pulse Rate 71 73 70


 


Pulse Rate [   





Bilateral]   


 


Pulse Rate [ 71  





From Monitor]   


 


Respiratory 17  24





Rate   


 


Respiratory   





Rate [Bilateral   





]   


 


Respiratory   





Rate [Chest]   


 


Blood Pressure 139/67  140/78


 


O2 Sat by Pulse 98  99





Oximetry   














  06/02/22 06/02/22 06/02/22





  06:00 07:00 08:00


 


Temperature   97.6 F


 


Pulse Rate 71 72 76


 


Pulse Rate [   





Bilateral]   


 


Pulse Rate [   





From Monitor]   


 


Respiratory 17 31 H 22





Rate   


 


Respiratory   





Rate [Bilateral   





]   


 


Respiratory   





Rate [Chest]   


 


Blood Pressure 142/78 144/85 151/86


 


O2 Sat by Pulse 100 99 100





Oximetry   














  06/02/22 06/02/22 06/02/22





  08:17 08:18 08:24


 


Temperature   


 


Pulse Rate   


 


Pulse Rate [  77 





Bilateral]   


 


Pulse Rate [   





From Monitor]   


 


Respiratory   





Rate   


 


Respiratory  14 





Rate [Bilateral   





]   


 


Respiratory   





Rate [Chest]   


 


Blood Pressure   


 


O2 Sat by Pulse 97  97





Oximetry   














  06/02/22 06/02/22 06/02/22





  09:00 09:22 09:25


 


Temperature   


 


Pulse Rate 73 74 74


 


Pulse Rate [   





Bilateral]   


 


Pulse Rate [ 71  





From Monitor]   


 


Respiratory 20  





Rate   


 


Respiratory   





Rate [Bilateral   





]   


 


Respiratory   





Rate [Chest]   


 


Blood Pressure 145/82  


 


O2 Sat by Pulse 100  





Oximetry   














  06/02/22 06/02/22 06/02/22





  10:00 11:00 12:00


 


Temperature   


 


Pulse Rate 72 63 64


 


Pulse Rate [   





Bilateral]   


 


Pulse Rate [   





From Monitor]   


 


Respiratory 21 16 18





Rate   


 


Respiratory   





Rate [Bilateral   





]   


 


Respiratory   





Rate [Chest]   


 


Blood Pressure 147/79 146/83 142/78


 


O2 Sat by Pulse 100 100 100





Oximetry   














  06/02/22 06/02/22 06/02/22





  13:00 14:00 15:26


 


Temperature   


 


Pulse Rate 62 66 


 


Pulse Rate [   74





Bilateral]   


 


Pulse Rate [ 71  





From Monitor]   


 


Respiratory 16 15 





Rate   


 


Respiratory   16





Rate [Bilateral   





]   


 


Respiratory 16  





Rate [Chest]   


 


Blood Pressure 126/66 111/70 


 


O2 Sat by Pulse 100 100 





Oximetry   














- General Appearance


General appearance: other (Awake & alert)


EENT: PERRL


Neck: JVD


Respiratory: Present: Rales


Cardiology: regular, S1S2


Gastrointestinal: normal


Neurologic: no focal deficit





- Lab





                                 06/02/22 05:21





                                 06/02/22 05:21


                             Most recent lab results











Calcium  8.7 mg/dL (8.4-10.2)   06/02/22  05:21    


 


Phosphorus  3.90 mg/dL (2.5-4.5)  D 06/02/22  05:21    


 


Magnesium  2.00 mg/dL (1.7-2.3)   06/02/22  05:21    














Medications & Allergies





- Medications


Allergies/Adverse Reactions: 


                                    Allergies





No Known Allergies Allergy (Unverified 01/10/14 09:48)


   








Home Medications: 


                                Home Medications











 Medication  Instructions  Recorded  Confirmed  Last Taken  Type


 


NIFEdipine XL [Procardia Xl] 90 mg PO QDAY #60 tablet 05/12/22 06/01/22 05/31/22

09:00 Rx


 


Labetalol HCl [Labetalol 300mg TAB] 300 mg PO Q12H 06/01/22 06/01/22 05/31/22 

09:00 History











Active Medications: 














Generic Name Dose Route Start Last Admin





  Trade Name Freq  PRN Reason Stop Dose Admin


 


Acetaminophen  650 mg  06/01/22 01:35 





  Acetaminophen 325 Mg Tab  PO  





  Q4H PRN  





  Pain MILD(1-3)/Fever >100.5/HA  


 


Albuterol  2 puff  06/02/22 14:00  06/02/22 15:25





  Albuterol 8.5 Gm Mdi Inhalation  IH   2 inh





  TIDRT [facility]   Administration


 


Azithromycin  500 mg  06/02/22 10:00  06/02/22 09:20





  Azithromycin 250 Mg Tab  PO  06/05/22 10:01  500 mg





  QDAY [facility]   Administration





  Protocol  


 


Carvedilol  25 mg  06/01/22 17:00  06/02/22 09:22





  Carvedilol 25 Mg Tab  PO   25 mg





  BID@0800,1700 [facility]   Administration


 


Dexamethasone  6 mg  06/01/22 16:00  06/01/22 16:37





  Dexamethasone 4 Mg Tab  PO  06/10/22 16:01  6 mg





  Q24H [facility]   Administration


 


Diphenhydramine HCl  25 mg  06/01/22 19:43  06/02/22 05:28





  Diphenhydramine 50 Mg/Ml Vial  IV   25 mg





  Q4H PRN   Administration





  Itching  


 


Doxazosin Mesylate  4 mg  06/01/22 06:00  06/02/22 09:25





  Doxazosin 4 Mg Tab  PO   4 mg





  BID NANCY   Administration


 


Epoetin Ranjeet-epbx  10,000 unit  06/01/22 07:52  06/01/22 18:24





  Epoetin Ranjeet-Epbx 10,000 Unit/1 Ml Vial  SUB-Q   10,000 unit





  DEONDRE PRN   Administration





  hemodialysis  


 


Famotidine  20 mg  06/01/22 10:00  06/02/22 09:22





  Famotidine 20 Mg Tab  PO   20 mg





  QAM NANCY   Administration


 


Heparin Sodium (Porcine)  5,000 unit  06/01/22 10:00  06/02/22 09:22





  Heparin 5,000 Unit/1 Ml Vial  SUB-Q   5,000 unit





  Q12HR NANCY   Administration


 


Heparin Sodium (Porcine)  3,000 unit  06/01/22 07:52 





  Heparin 10,000 Units/10 Ml Vial  IV  





  DEONDRE PRN  





  hemodialysis  


 


Hydralazine HCl  10 mg  06/01/22 03:00  06/01/22 02:34





  Hydralazine 20 Mg/1 Ml Inj  IV   10 mg





  Q6H PRN   Administration





  Blood Pressure  


 


Hydralazine HCl  100 mg  06/01/22 09:00  06/02/22 15:30





  Hydralazine 100 Mg Tab  PO   Not Given





  [facility]  


 


Ceftriaxone Sodium  1 gm in 50 mls @ 100 mls/hr  06/01/22 16:00  06/01/22 16:37





  Rocephin/Ns 1 Gm/50 Ml  IV   100 mls/hr





  Q24H NANCY   Administration





  Protocol  


 


Sodium Chloride  100 mls @ 999 mls/hr  06/01/22 17:00 





  Nacl 0.9%  IV  





  DEONDRE PRN  





  Hypotension  


 


Isosorbide Mononitrate  60 mg  06/01/22 06:00  06/02/22 09:25





  Isosorbide Mononitrate Er 60 Mg Tab  PO   60 mg





  QDAY NANCY   Administration


 


Labetalol HCl  10 mg  06/01/22 08:09 





  Labetalol 20 Mg/4 Ml Inj  IV  





  Q4H PRN  





  sbp>160  


 


Morphine Sulfate  2 mg  06/01/22 01:35 





  Morphine 2 Mg/1 Ml Inj  IV  





  Q4H PRN  





  Pain, Moderate (4-6)  


 


Morphine Sulfate  4 mg  06/01/22 01:35 





  Morphine 4 Mg/1 Ml Inj  IV  





  Q4H PRN  





  Pain , Severe (7-10)  


 


Nifedipine  60 mg  06/01/22 22:00  06/02/22 09:21





  Nifedipine Xl 60 Mg Tab  PO   60 mg





  BID NANCY   Administration


 


Ondansetron HCl  4 mg  06/01/22 01:35 





  Ondansetron 4 Mg/2 Ml Inj  IV  





  Q8H PRN  





  Nausea And Vomiting  


 


Sodium Chloride  10 ml  06/01/22 10:00  06/02/22 09:25





  Sodium Chloride 0.9% 10 Ml Flush Syringe  IV   10 ml





  BID NANCY   Administration


 


Sodium Chloride  10 ml  06/01/22 01:35 





  Sodium Chloride 0.9% 10 Ml Flush Syringe  IV  





  PRN PRN  





  LINE FLUSH

## 2022-06-02 NOTE — DISCHARGE SUMMARY
Providers





- Providers


Date of Admission: 


06/01/22 01:35





Date of discharge: 06/02/22


Attending physician: 


ABIMAEL MERIDA MD





                                        





05/31/22 23:44


Consult to Physician [CONS] Stat 


   Comment: Dr. Srivastava spoke with Dr. Martinez @ [id number]


   Consulting Provider: JEFFREY MARTINEZ


   Physician Instructions: 


   Reason For Exam: End-stage renal disease needing dialysis











Primary care physician: 


LIBBY BREWSTER








Hospitalization


Reason for admission: shortness of breath


Condition: Stable


Hospital course: 





History of present illness: 





 48 years old male with history of end-stage renal disease on hemodialysis and 

COPD was brought to the emergency room because of shortness of breath and 

respiratory distress.  Patient got hemodialyzed yesterday.  Patient brought to 

the emergency room via EMS for evaluation of shortness of breath and difficulty 

in breathing that started this morning.  Patient denies any fever or chills.  He

 denies any chest pain.  Patient stated that he is compliant with his dialysis.





In the emergency room patient is found to have volume overload.  Subsequently 

patient was put on BiPAP and case was discussed with on-call nephrology who will

 see the patient and hemodialyzed the patient in the morning





Hospital Course:


6/1/2022: Home medication restarted, blood pressure control achieved.  Currently

 on Venturi mask saturating 98%, will attempt to titrate oxygen down as patient 

sats tolerate.  Patient to go for [facility] HD today with subsequent treatment 

tomorrow. Plan for dc tomorrow after HD.





6/2/2022: Patient blood pressure controlled. Received dialysis treatment today 

with approx 3L removed. He is currently on room air saturating in the upper 

90's. Plan for discharge home. Prescriptions for azithromycin course, decadron 

course, hydralazine, coreg, procardia, imdur, and cardura sent to patient 

pharmacy. He is advised to remain complaint on hemodialysis and follow up with 

his nephrologist as an outpatient. He is advised to follow up with his primary 

care physician in 3-5 days. He is also advised to self quarantine for 7-10 days 

if possible due to his coronavirus infection (positive on 6/1).





Assessment and Plan:


#Hypertensive emergency


- restart home medications: Doxazosin, Procardia, Coreg, hydralazine


- avoid catapres if possible


- hydralazine IV prn, labetalol IV prn


- may require cardene gtt if po and prn IV meds do not control.





#End-stage renal disease needing dialysis


- Hemodialysis per nephrology, on MWF schedule, compliant


- patient OP nephro doc is Dr. Pan, consulted


- avoid nephrotoxic agents


- monitor I/O's


- renal adjust medications


- consult nephrology


- daily renal profile





# Flash Pulmonary edema


-Chest x-ray consistent with findings of pulmonary edema


currently 98 % on venturi mask, titrate down to nc.


-Likely resulting from poorly controlled bp.


-Fluid restriction.  


- place on BiPAP.  





# Volume overload


 - Fluid restriction. 





#Anemia of CKD


-Hemoglobin 8.8


-Epogen as deemed appropriate by nephrology





# Hypertension


Coreg 6.25 mg p.o. twice daily.  Cardura 4 mg p.o. twice daily.  Hydralazine 50 

mg p.o. 3 times daily.  Procardia XL 90 mg p.o. daily





# DVT prophylaxis 


Heparin 5000 units subcu every 12 hours for DVT prophylaxis.  Pepcid 20 mg p.o. 

twice daily for GI prophylaxis.  Patient is a full code








Disposition: 30 STILL A PATIENT


Final Discharge Diagnosis (Prints w/discharge instructions): pulmonary edema, 

volume overload, coronavirus infection


Time spent for discharge: 35





Core Measure Documentation





- Palliative Care


Palliative Care/ Comfort Measures: Not Applicable





- Core Measures


Any of the following diagnoses?: none





Exam





- Constitutional


Vitals: 


                                        











Temp Pulse Resp BP Pulse Ox


 


 97.5 F L  76   16   134/74   96 


 


 06/02/22 16:00  06/02/22 16:00  06/02/22 15:26  06/02/22 16:00  06/02/22 16:00














Plan


Plan of Treatment: 


 Patient blood pressure controlled. Received dialysis treatment today with 

approx 3L removed. He is currently on room air saturating in the upper 90's. 

Plan for discharge home. Prescriptions for azithromycin course, decadron course,

 hydralazine, coreg, procardia, imdur, and cardura sent to patient pharmacy. He 

is advised to remain complaint on hemodialysis and follow up with his 

nephrologist as an outpatient. He is advised to follow up with his primary care 

physician in 3-5 days. He is also advised to self quarantine for 7-10 days if 

possible due to his coronavirus infection (positive on 6/1).


Follow up with: 


LIBBY BREWSTER MD [Primary Care Provider] - 3-5 Days


Prescriptions: 


hydrALAZINE [Apresoline TAB] 100 mg PO Q8HR 30 Days #90 tab


Doxazosin [Cardura] 4 mg PO BID 30 Days #30 tablet


carvediloL [Coreg] 25 mg PO BID@0800,1700 30 Days #60 tablet


dexAMETHasone [Decadron] 6 mg PO Q24H 8 Days #8 tablet


ISOSORBIDE MONOnitrate [Imdur ER] 60 mg PO QDAY 30 Days #30 tablet


NIFEdipine XL [Procardia Xl] 60 mg PO BID 30 Days #60 tablet


Azithromycin [Zithromax TAB] 500 mg PO QDAY 3 Days #3 tablet